# Patient Record
Sex: MALE | Race: BLACK OR AFRICAN AMERICAN | NOT HISPANIC OR LATINO
[De-identification: names, ages, dates, MRNs, and addresses within clinical notes are randomized per-mention and may not be internally consistent; named-entity substitution may affect disease eponyms.]

---

## 2017-10-09 PROBLEM — Z00.00 ENCOUNTER FOR PREVENTIVE HEALTH EXAMINATION: Status: ACTIVE | Noted: 2017-10-09

## 2017-10-30 ENCOUNTER — APPOINTMENT (OUTPATIENT)
Dept: NEPHROLOGY | Facility: CLINIC | Age: 67
End: 2017-10-30
Payer: MEDICAID

## 2017-10-30 ENCOUNTER — LABORATORY RESULT (OUTPATIENT)
Age: 67
End: 2017-10-30

## 2017-10-30 VITALS
DIASTOLIC BLOOD PRESSURE: 70 MMHG | SYSTOLIC BLOOD PRESSURE: 100 MMHG | HEIGHT: 67 IN | HEART RATE: 71 BPM | WEIGHT: 140 LBS | OXYGEN SATURATION: 99 % | BODY MASS INDEX: 21.97 KG/M2

## 2017-10-30 DIAGNOSIS — Z87.891 PERSONAL HISTORY OF NICOTINE DEPENDENCE: ICD-10-CM

## 2017-10-30 PROCEDURE — 36415 COLL VENOUS BLD VENIPUNCTURE: CPT

## 2017-10-30 PROCEDURE — 99205 OFFICE O/P NEW HI 60 MIN: CPT | Mod: 25

## 2017-10-30 RX ORDER — ASPIRIN 81 MG/1
81 TABLET, CHEWABLE ORAL DAILY
Qty: 90 | Refills: 3 | Status: ACTIVE | COMMUNITY
Start: 2017-10-30

## 2017-10-30 RX ORDER — FAMOTIDINE 40 MG/1
40 TABLET, FILM COATED ORAL DAILY
Refills: 0 | Status: ACTIVE | COMMUNITY
Start: 2017-10-30

## 2017-10-30 RX ORDER — DEXAMETHASONE 4 MG/1
4 TABLET ORAL TWICE DAILY
Refills: 0 | Status: ACTIVE | COMMUNITY
Start: 2017-10-30

## 2017-10-30 RX ORDER — FERROUS SULFATE TAB EC 325 MG (65 MG FE EQUIVALENT) 325 (65 FE) MG
325 (65 FE) TABLET DELAYED RESPONSE ORAL DAILY
Refills: 0 | Status: ACTIVE | COMMUNITY
Start: 2017-10-30

## 2017-10-30 RX ORDER — DOCUSATE SODIUM 100 MG/1
100 CAPSULE ORAL
Refills: 0 | Status: ACTIVE | COMMUNITY
Start: 2017-10-30

## 2017-10-30 RX ORDER — WARFARIN SODIUM 2 MG/1
2 TABLET ORAL DAILY
Refills: 0 | Status: ACTIVE | COMMUNITY
Start: 2017-10-30

## 2017-10-30 RX ORDER — TAMSULOSIN HYDROCHLORIDE 0.4 MG/1
0.4 CAPSULE ORAL
Qty: 90 | Refills: 1 | Status: ACTIVE | COMMUNITY
Start: 2017-10-30

## 2017-10-31 LAB
25(OH)D3 SERPL-MCNC: 20.9 NG/ML
ALBUMIN SERPL ELPH-MCNC: 3.5 G/DL
ANION GAP SERPL CALC-SCNC: 20 MMOL/L
APPEARANCE: CLEAR
BACTERIA: NEGATIVE
BASOPHILS # BLD AUTO: 0 K/UL
BASOPHILS NFR BLD AUTO: 0 %
BILIRUBIN URINE: NEGATIVE
BLOOD URINE: ABNORMAL
BUN SERPL-MCNC: 18 MG/DL
CALCIUM SERPL-MCNC: 8.8 MG/DL
CALCIUM SERPL-MCNC: 8.8 MG/DL
CHLORIDE SERPL-SCNC: 98 MMOL/L
CO2 SERPL-SCNC: 20 MMOL/L
COLOR: YELLOW
CREAT SERPL-MCNC: 0.84 MG/DL
CREAT SPEC-SCNC: 136 MG/DL
CREAT/PROT UR: 0.2 RATIO
EOSINOPHIL # BLD AUTO: 0 K/UL
EOSINOPHIL NFR BLD AUTO: 0 %
GLUCOSE QUALITATIVE U: NEGATIVE MG/DL
GLUCOSE SERPL-MCNC: 155 MG/DL
HCT VFR BLD CALC: 35.6 %
HGB BLD-MCNC: 11.5 G/DL
HYALINE CASTS: 2 /LPF
KETONES URINE: ABNORMAL
LEUKOCYTE ESTERASE URINE: NEGATIVE
LYMPHOCYTES # BLD AUTO: 0.86 K/UL
LYMPHOCYTES NFR BLD AUTO: 10.4 %
MAN DIFF?: NORMAL
MCHC RBC-ENTMCNC: 31.3 PG
MCHC RBC-ENTMCNC: 32.3 GM/DL
MCV RBC AUTO: 96.7 FL
MICROSCOPIC-UA: NORMAL
MONOCYTES # BLD AUTO: 0.47 K/UL
MONOCYTES NFR BLD AUTO: 5.7 %
NEUTROPHILS # BLD AUTO: 6.76 K/UL
NEUTROPHILS NFR BLD AUTO: 82.1 %
NITRITE URINE: NEGATIVE
PARATHYROID HORMONE INTACT: 35 PG/ML
PH URINE: 5
PHOSPHATE SERPL-MCNC: 4 MG/DL
PLATELET # BLD AUTO: 320 K/UL
POTASSIUM SERPL-SCNC: 4.2 MMOL/L
PROT UR-MCNC: 29 MG/DL
PROTEIN URINE: ABNORMAL MG/DL
RBC # BLD: 3.68 M/UL
RBC # FLD: 17.2 %
RED BLOOD CELLS URINE: 3 /HPF
SODIUM SERPL-SCNC: 138 MMOL/L
SPECIFIC GRAVITY URINE: 1.03
SQUAMOUS EPITHELIAL CELLS: 2 /HPF
UROBILINOGEN URINE: NEGATIVE MG/DL
WBC # FLD AUTO: 8.23 K/UL
WHITE BLOOD CELLS URINE: 3 /HPF

## 2017-10-31 RX ORDER — CHOLECALCIFEROL (VITAMIN D3) 1250 MCG
1.25 MG CAPSULE ORAL
Qty: 13 | Refills: 3 | Status: ACTIVE | COMMUNITY
Start: 2017-10-31

## 2017-11-30 ENCOUNTER — APPOINTMENT (OUTPATIENT)
Dept: NEPHROLOGY | Facility: CLINIC | Age: 67
End: 2017-11-30

## 2017-12-18 ENCOUNTER — APPOINTMENT (OUTPATIENT)
Dept: NEPHROLOGY | Facility: CLINIC | Age: 67
End: 2017-12-18
Payer: MEDICAID

## 2017-12-18 VITALS
BODY MASS INDEX: 21.97 KG/M2 | SYSTOLIC BLOOD PRESSURE: 100 MMHG | WEIGHT: 140 LBS | HEIGHT: 67 IN | DIASTOLIC BLOOD PRESSURE: 60 MMHG

## 2017-12-18 DIAGNOSIS — E55.9 VITAMIN D DEFICIENCY, UNSPECIFIED: ICD-10-CM

## 2017-12-18 DIAGNOSIS — N18.3 CHRONIC KIDNEY DISEASE, STAGE 3 (MODERATE): ICD-10-CM

## 2017-12-18 PROCEDURE — 99215 OFFICE O/P EST HI 40 MIN: CPT | Mod: 25

## 2017-12-18 PROCEDURE — 36415 COLL VENOUS BLD VENIPUNCTURE: CPT

## 2017-12-19 LAB
ALBUMIN SERPL ELPH-MCNC: 3 G/DL
ANION GAP SERPL CALC-SCNC: 22 MMOL/L
APPEARANCE: CLEAR
BACTERIA: NEGATIVE
BASOPHILS # BLD AUTO: 0.01 K/UL
BASOPHILS NFR BLD AUTO: 0.3 %
BILIRUBIN URINE: NEGATIVE
BLOOD URINE: ABNORMAL
BUN SERPL-MCNC: 11 MG/DL
CALCIUM SERPL-MCNC: 8.3 MG/DL
CHLORIDE SERPL-SCNC: 92 MMOL/L
CO2 SERPL-SCNC: 22 MMOL/L
COLOR: YELLOW
CREAT SERPL-MCNC: 0.48 MG/DL
CREAT SPEC-SCNC: 73 MG/DL
CREAT/PROT UR: 0.7 RATIO
EOSINOPHIL # BLD AUTO: 0.06 K/UL
EOSINOPHIL NFR BLD AUTO: 1.6 %
GLUCOSE QUALITATIVE U: 1000 MG/DL
GLUCOSE SERPL-MCNC: 273 MG/DL
HCT VFR BLD CALC: 29.8 %
HGB BLD-MCNC: 9.3 G/DL
HYALINE CASTS: 2 /LPF
IMM GRANULOCYTES NFR BLD AUTO: 0.8 %
KETONES URINE: ABNORMAL
LEUKOCYTE ESTERASE URINE: NEGATIVE
LYMPHOCYTES # BLD AUTO: 0.66 K/UL
LYMPHOCYTES NFR BLD AUTO: 18.1 %
MAN DIFF?: NORMAL
MCHC RBC-ENTMCNC: 31.2 GM/DL
MCHC RBC-ENTMCNC: 31.5 PG
MCV RBC AUTO: 101 FL
MICROSCOPIC-UA: NORMAL
MONOCYTES # BLD AUTO: 0.49 K/UL
MONOCYTES NFR BLD AUTO: 13.4 %
NEUTROPHILS # BLD AUTO: 2.4 K/UL
NEUTROPHILS NFR BLD AUTO: 65.8 %
NITRITE URINE: NEGATIVE
PH URINE: 5
PHOSPHATE SERPL-MCNC: 3.2 MG/DL
PLATELET # BLD AUTO: 248 K/UL
POTASSIUM SERPL-SCNC: 4.5 MMOL/L
PROT UR-MCNC: 54 MG/DL
PROTEIN URINE: 30 MG/DL
RBC # BLD: 2.95 M/UL
RBC # FLD: 17.1 %
RED BLOOD CELLS URINE: 2 /HPF
SODIUM SERPL-SCNC: 136 MMOL/L
SPECIFIC GRAVITY URINE: 1.04
SQUAMOUS EPITHELIAL CELLS: 2 /HPF
UROBILINOGEN URINE: NEGATIVE MG/DL
WBC # FLD AUTO: 3.65 K/UL
WHITE BLOOD CELLS URINE: 5 /HPF

## 2018-01-01 ENCOUNTER — OUTPATIENT (OUTPATIENT)
Dept: OUTPATIENT SERVICES | Facility: HOSPITAL | Age: 68
LOS: 1 days | End: 2018-01-01
Payer: MEDICAID

## 2018-01-23 ENCOUNTER — EMERGENCY (EMERGENCY)
Facility: HOSPITAL | Age: 68
LOS: 1 days | Discharge: HOME | End: 2018-01-23
Attending: EMERGENCY MEDICINE
Payer: COMMERCIAL

## 2018-01-23 VITALS
SYSTOLIC BLOOD PRESSURE: 110 MMHG | DIASTOLIC BLOOD PRESSURE: 68 MMHG | TEMPERATURE: 98 F | HEART RATE: 98 BPM | OXYGEN SATURATION: 98 % | RESPIRATION RATE: 18 BRPM

## 2018-01-23 VITALS
SYSTOLIC BLOOD PRESSURE: 102 MMHG | HEART RATE: 115 BPM | RESPIRATION RATE: 20 BRPM | DIASTOLIC BLOOD PRESSURE: 63 MMHG | HEIGHT: 67 IN | OXYGEN SATURATION: 97 % | WEIGHT: 160.06 LBS | TEMPERATURE: 98 F

## 2018-01-23 LAB
ALBUMIN SERPL ELPH-MCNC: 2.9 G/DL — LOW (ref 3.3–5.2)
ALP SERPL-CCNC: 229 U/L — HIGH (ref 40–120)
ALT FLD-CCNC: 39 U/L — SIGNIFICANT CHANGE UP
ANION GAP SERPL CALC-SCNC: 14 MMOL/L — SIGNIFICANT CHANGE UP (ref 5–17)
APTT BLD: 32 SEC — SIGNIFICANT CHANGE UP (ref 27.5–37.4)
AST SERPL-CCNC: 55 U/L — HIGH
BILIRUB SERPL-MCNC: 0.6 MG/DL — SIGNIFICANT CHANGE UP (ref 0.4–2)
BUN SERPL-MCNC: 7 MG/DL — LOW (ref 8–20)
CALCIUM SERPL-MCNC: 7.9 MG/DL — LOW (ref 8.6–10.2)
CHLORIDE SERPL-SCNC: 90 MMOL/L — LOW (ref 98–107)
CO2 SERPL-SCNC: 25 MMOL/L — SIGNIFICANT CHANGE UP (ref 22–29)
CREAT SERPL-MCNC: 0.38 MG/DL — LOW (ref 0.5–1.3)
GLUCOSE SERPL-MCNC: 78 MG/DL — SIGNIFICANT CHANGE UP (ref 70–115)
HCT VFR BLD CALC: 27.7 % — LOW (ref 42–52)
HGB BLD-MCNC: 9.1 G/DL — LOW (ref 14–18)
INR BLD: 1.91 RATIO — HIGH (ref 0.88–1.16)
MCHC RBC-ENTMCNC: 32.9 G/DL — SIGNIFICANT CHANGE UP (ref 32–36)
MCHC RBC-ENTMCNC: 32.9 PG — HIGH (ref 27–31)
MCV RBC AUTO: 100 FL — HIGH (ref 80–94)
PLATELET # BLD AUTO: 197 K/UL — SIGNIFICANT CHANGE UP (ref 150–400)
POTASSIUM SERPL-MCNC: 4.7 MMOL/L — SIGNIFICANT CHANGE UP (ref 3.5–5.3)
POTASSIUM SERPL-SCNC: 4.7 MMOL/L — SIGNIFICANT CHANGE UP (ref 3.5–5.3)
PROT SERPL-MCNC: 6.2 G/DL — LOW (ref 6.6–8.7)
PROTHROM AB SERPL-ACNC: 21.3 SEC — HIGH (ref 9.8–12.7)
RBC # BLD: 2.77 M/UL — LOW (ref 4.6–6.2)
RBC # FLD: 16.7 % — HIGH (ref 11–15.6)
SODIUM SERPL-SCNC: 129 MMOL/L — LOW (ref 135–145)
WBC # BLD: 3.7 K/UL — LOW (ref 4.8–10.8)
WBC # FLD AUTO: 3.7 K/UL — LOW (ref 4.8–10.8)

## 2018-01-23 PROCEDURE — 99283 EMERGENCY DEPT VISIT LOW MDM: CPT

## 2018-01-23 PROCEDURE — 82962 GLUCOSE BLOOD TEST: CPT

## 2018-01-23 PROCEDURE — 99284 EMERGENCY DEPT VISIT MOD MDM: CPT

## 2018-01-23 PROCEDURE — 80053 COMPREHEN METABOLIC PANEL: CPT

## 2018-01-23 PROCEDURE — 85027 COMPLETE CBC AUTOMATED: CPT

## 2018-01-23 PROCEDURE — 36415 COLL VENOUS BLD VENIPUNCTURE: CPT

## 2018-01-23 PROCEDURE — 85610 PROTHROMBIN TIME: CPT

## 2018-01-23 PROCEDURE — 85730 THROMBOPLASTIN TIME PARTIAL: CPT

## 2018-01-23 NOTE — ED ADULT TRIAGE NOTE - CHIEF COMPLAINT QUOTE
Patient brought in by ambulance A/Ox3, was found unresponsive, BS-12, given 1 amp of D50-BS at this time 97.

## 2018-01-23 NOTE — ED PROVIDER NOTE - OBJECTIVE STATEMENT
This is a 66 y/o M PMHx DVT, PE prostate CA and IDDM presents to ED c/o medical evaluation. Patients wife reports they were at home, pt asked for food, she went to get food and when she came back pt was unarousable and shaking. Pt was at [facility] ED yesterday morning for same Sx; did not eat, took his insulin and became unresponsive and began to shake. Today pt did not take his insulin due to fearing having same episode occur. Pt had and egg sandwich, crackers and coffee today. As per daughter for the past year pt has had to be forced to eat due to decreased apatite. On Pepcid, Coumadin and Novolog. Denies N/V/D, fever, chills, SOB, CP, difficulty breathing, HA, numbness, tingling and abd pain. This is a 68 y/o M PMHx DVT, PE prostate CA and IDDM presents to ED c/o medical evaluation. Patients wife reports they were at home, pt asked for food, she went to get food and when she came back pt was unarousable and shaking. Pt was at [facility] ED yesterday morning for same Sx; did not eat, took his insulin and became unresponsive and began to shake. Today pt did not take his insulin due to fearing having same episode occur. Pt had and egg sandwich, crackers and coffee today. As per daughter for the past year pt has had to be forced to eat due to decreased apatite. On Pepcid, Coumadin and Novolog. Denies N/V/D, fever, chills, SOB, CP, difficulty breathing, HA, numbness, tingling and abd pain.    Patient reports that he DID take his lantus last night, but no short acting insulin today, did not eat much today.

## 2018-01-23 NOTE — ED PROVIDER NOTE - PMH
DVT (deep venous thrombosis)    IDDM (insulin dependent diabetes mellitus)    Prostate CA    Pulmonary embolism

## 2018-01-23 NOTE — ED PROVIDER NOTE - PROGRESS NOTE DETAILS
reassessed, looks and feels well, FS at good level; advised on medication adjustments and to f/u with PMD this week

## 2018-01-24 DIAGNOSIS — R69 ILLNESS, UNSPECIFIED: ICD-10-CM

## 2018-05-28 ENCOUNTER — INPATIENT (INPATIENT)
Facility: HOSPITAL | Age: 68
LOS: 12 days | DRG: 811 | End: 2018-06-10
Attending: HOSPITALIST | Admitting: HOSPITALIST
Payer: COMMERCIAL

## 2018-05-28 VITALS
HEART RATE: 111 BPM | TEMPERATURE: 98 F | SYSTOLIC BLOOD PRESSURE: 103 MMHG | RESPIRATION RATE: 40 BRPM | OXYGEN SATURATION: 100 % | DIASTOLIC BLOOD PRESSURE: 70 MMHG

## 2018-05-28 DIAGNOSIS — C61 MALIGNANT NEOPLASM OF PROSTATE: ICD-10-CM

## 2018-05-28 DIAGNOSIS — K92.0 HEMATEMESIS: ICD-10-CM

## 2018-05-28 DIAGNOSIS — D64.9 ANEMIA, UNSPECIFIED: ICD-10-CM

## 2018-05-28 LAB
ABO RH CONFIRMATION: SIGNIFICANT CHANGE UP
ACETONE SERPL-MCNC: NEGATIVE — SIGNIFICANT CHANGE UP
ALBUMIN SERPL ELPH-MCNC: 2.6 G/DL — LOW (ref 3.3–5.2)
ALP SERPL-CCNC: 176 U/L — HIGH (ref 40–120)
ALT FLD-CCNC: 11 U/L — SIGNIFICANT CHANGE UP
ANION GAP SERPL CALC-SCNC: 17 MMOL/L — SIGNIFICANT CHANGE UP (ref 5–17)
ANISOCYTOSIS BLD QL: SLIGHT — SIGNIFICANT CHANGE UP
APTT BLD: 29 SEC — SIGNIFICANT CHANGE UP (ref 27.5–37.4)
AST SERPL-CCNC: 22 U/L — SIGNIFICANT CHANGE UP
BASE EXCESS BLDV CALC-SCNC: -2.6 MMOL/L — LOW (ref -2–2)
BASOPHILS # BLD AUTO: 0 K/UL — SIGNIFICANT CHANGE UP (ref 0–0.2)
BILIRUB SERPL-MCNC: 0.6 MG/DL — SIGNIFICANT CHANGE UP (ref 0.4–2)
BLD GP AB SCN SERPL QL: SIGNIFICANT CHANGE UP
BUN SERPL-MCNC: 7 MG/DL — LOW (ref 8–20)
CA-I SERPL-SCNC: 1.04 MMOL/L — LOW (ref 1.15–1.33)
CALCIUM SERPL-MCNC: 7.8 MG/DL — LOW (ref 8.6–10.2)
CHLORIDE BLDV-SCNC: 95 MMOL/L — LOW (ref 98–107)
CHLORIDE SERPL-SCNC: 91 MMOL/L — LOW (ref 98–107)
CO2 SERPL-SCNC: 20 MMOL/L — LOW (ref 22–29)
CREAT SERPL-MCNC: 0.44 MG/DL — LOW (ref 0.5–1.3)
EOSINOPHIL # BLD AUTO: 0 K/UL — SIGNIFICANT CHANGE UP (ref 0–0.5)
EOSINOPHIL NFR BLD AUTO: 1 % — SIGNIFICANT CHANGE UP (ref 0–6)
GAS PNL BLDV: 128 MMOL/L — LOW (ref 135–145)
GAS PNL BLDV: SIGNIFICANT CHANGE UP
GAS PNL BLDV: SIGNIFICANT CHANGE UP
GLUCOSE BLDV-MCNC: 392 MG/DL — HIGH (ref 70–99)
GLUCOSE SERPL-MCNC: 399 MG/DL — HIGH (ref 70–115)
HCO3 BLDV-SCNC: 22 MMOL/L — SIGNIFICANT CHANGE UP (ref 20–26)
HCT VFR BLD CALC: 12.3 % — CRITICAL LOW (ref 42–52)
HCT VFR BLD CALC: 22.8 % — LOW (ref 42–52)
HCT VFR BLDA CALC: 12 — CRITICAL LOW (ref 39–50)
HGB BLD-MCNC: 3.9 G/DL — CRITICAL LOW (ref 14–18)
HGB BLD-MCNC: 7.3 G/DL — LOW (ref 14–18)
HYPOCHROMIA BLD QL: SIGNIFICANT CHANGE UP
INR BLD: 1.46 RATIO — HIGH (ref 0.88–1.16)
LACTATE BLDV-MCNC: 3.6 MMOL/L — HIGH (ref 0.5–2)
LYMPHOCYTES # BLD AUTO: 1.5 K/UL — SIGNIFICANT CHANGE UP (ref 1–4.8)
LYMPHOCYTES # BLD AUTO: 19 % — LOW (ref 20–55)
MACROCYTES BLD QL: SLIGHT — SIGNIFICANT CHANGE UP
MAGNESIUM SERPL-MCNC: 1.9 MG/DL — SIGNIFICANT CHANGE UP (ref 1.6–2.6)
MCHC RBC-ENTMCNC: 29.2 PG — SIGNIFICANT CHANGE UP (ref 27–31)
MCHC RBC-ENTMCNC: 31.4 G/DL — LOW (ref 32–36)
MCHC RBC-ENTMCNC: 31.7 PG — HIGH (ref 27–31)
MCHC RBC-ENTMCNC: 32 G/DL — SIGNIFICANT CHANGE UP (ref 32–36)
MCV RBC AUTO: 100.8 FL — HIGH (ref 80–94)
MCV RBC AUTO: 91.2 FL — SIGNIFICANT CHANGE UP (ref 80–94)
MONOCYTES # BLD AUTO: 0.9 K/UL — HIGH (ref 0–0.8)
MONOCYTES NFR BLD AUTO: 11 % — HIGH (ref 3–10)
NEUTROPHILS # BLD AUTO: 4.1 K/UL — SIGNIFICANT CHANGE UP (ref 1.8–8)
NEUTROPHILS NFR BLD AUTO: 68 % — SIGNIFICANT CHANGE UP (ref 37–73)
NEUTS BAND # BLD: 1 % — SIGNIFICANT CHANGE UP (ref 0–8)
OTHER CELLS CSF MANUAL: 5 ML/DL — LOW (ref 18–22)
OVALOCYTES BLD QL SMEAR: SLIGHT — SIGNIFICANT CHANGE UP
PCO2 BLDV: 37 MMHG — SIGNIFICANT CHANGE UP (ref 35–50)
PH BLDV: 7.38 — SIGNIFICANT CHANGE UP (ref 7.32–7.43)
PLAT MORPH BLD: SIGNIFICANT CHANGE UP
PLATELET # BLD AUTO: 113 K/UL — LOW (ref 150–400)
PLATELET # BLD AUTO: 115 K/UL — LOW (ref 150–400)
PO2 BLDV: 73 MMHG — HIGH (ref 25–45)
POIKILOCYTOSIS BLD QL AUTO: SLIGHT — SIGNIFICANT CHANGE UP
POLYCHROMASIA BLD QL SMEAR: SLIGHT — SIGNIFICANT CHANGE UP
POTASSIUM BLDV-SCNC: 4 MMOL/L — SIGNIFICANT CHANGE UP (ref 3.4–4.5)
POTASSIUM SERPL-MCNC: 4.1 MMOL/L — SIGNIFICANT CHANGE UP (ref 3.5–5.3)
POTASSIUM SERPL-SCNC: 4.1 MMOL/L — SIGNIFICANT CHANGE UP (ref 3.5–5.3)
PROT SERPL-MCNC: 7 G/DL — SIGNIFICANT CHANGE UP (ref 6.6–8.7)
PROTHROM AB SERPL-ACNC: 16.2 SEC — HIGH (ref 9.8–12.7)
RBC # BLD: 1.2 M/UL — LOW (ref 4.6–6.2)
RBC # BLD: 2.5 M/UL — LOW (ref 4.6–6.2)
RBC # FLD: 20.5 % — HIGH (ref 11–15.6)
RBC # FLD: 22.6 % — HIGH (ref 11–15.6)
RBC BLD AUTO: ABNORMAL
SAO2 % BLDV: 94 % — SIGNIFICANT CHANGE UP
SODIUM SERPL-SCNC: 128 MMOL/L — LOW (ref 135–145)
SPHEROCYTES BLD QL SMEAR: SLIGHT — SIGNIFICANT CHANGE UP
TYPE + AB SCN PNL BLD: SIGNIFICANT CHANGE UP
WBC # BLD: 4.9 K/UL — SIGNIFICANT CHANGE UP (ref 4.8–10.8)
WBC # BLD: 6.6 K/UL — SIGNIFICANT CHANGE UP (ref 4.8–10.8)
WBC # FLD AUTO: 4.9 K/UL — SIGNIFICANT CHANGE UP (ref 4.8–10.8)
WBC # FLD AUTO: 6.6 K/UL — SIGNIFICANT CHANGE UP (ref 4.8–10.8)

## 2018-05-28 PROCEDURE — 70450 CT HEAD/BRAIN W/O DYE: CPT | Mod: 26

## 2018-05-28 PROCEDURE — 99223 1ST HOSP IP/OBS HIGH 75: CPT

## 2018-05-28 PROCEDURE — 71045 X-RAY EXAM CHEST 1 VIEW: CPT | Mod: 26

## 2018-05-28 PROCEDURE — 99285 EMERGENCY DEPT VISIT HI MDM: CPT

## 2018-05-28 PROCEDURE — 93010 ELECTROCARDIOGRAM REPORT: CPT

## 2018-05-28 PROCEDURE — 74174 CTA ABD&PLVS W/CONTRAST: CPT | Mod: 26

## 2018-05-28 RX ORDER — PIPERACILLIN AND TAZOBACTAM 4; .5 G/20ML; G/20ML
3.38 INJECTION, POWDER, LYOPHILIZED, FOR SOLUTION INTRAVENOUS ONCE
Qty: 0 | Refills: 0 | Status: COMPLETED | OUTPATIENT
Start: 2018-05-28 | End: 2018-05-28

## 2018-05-28 RX ORDER — ONDANSETRON 8 MG/1
4 TABLET, FILM COATED ORAL ONCE
Qty: 0 | Refills: 0 | Status: COMPLETED | OUTPATIENT
Start: 2018-05-28 | End: 2018-05-28

## 2018-05-28 RX ORDER — SODIUM CHLORIDE 9 MG/ML
1000 INJECTION INTRAMUSCULAR; INTRAVENOUS; SUBCUTANEOUS ONCE
Qty: 0 | Refills: 0 | Status: COMPLETED | OUTPATIENT
Start: 2018-05-28 | End: 2018-05-28

## 2018-05-28 RX ORDER — INSULIN HUMAN 100 [IU]/ML
4 INJECTION, SOLUTION SUBCUTANEOUS ONCE
Qty: 0 | Refills: 0 | Status: COMPLETED | OUTPATIENT
Start: 2018-05-28 | End: 2018-05-28

## 2018-05-28 RX ORDER — ONDANSETRON 8 MG/1
4 TABLET, FILM COATED ORAL EVERY 6 HOURS
Qty: 0 | Refills: 0 | Status: DISCONTINUED | OUTPATIENT
Start: 2018-05-28 | End: 2018-05-31

## 2018-05-28 RX ORDER — PANTOPRAZOLE SODIUM 20 MG/1
80 TABLET, DELAYED RELEASE ORAL ONCE
Qty: 0 | Refills: 0 | Status: COMPLETED | OUTPATIENT
Start: 2018-05-28 | End: 2018-05-28

## 2018-05-28 RX ORDER — HYDROMORPHONE HYDROCHLORIDE 2 MG/ML
0.5 INJECTION INTRAMUSCULAR; INTRAVENOUS; SUBCUTANEOUS EVERY 6 HOURS
Qty: 0 | Refills: 0 | Status: DISCONTINUED | OUTPATIENT
Start: 2018-05-28 | End: 2018-05-31

## 2018-05-28 RX ORDER — PHYTONADIONE (VIT K1) 5 MG
5 TABLET ORAL ONCE
Qty: 0 | Refills: 0 | Status: COMPLETED | OUTPATIENT
Start: 2018-05-28 | End: 2018-05-28

## 2018-05-28 RX ORDER — HYDROMORPHONE HYDROCHLORIDE 2 MG/ML
0.5 INJECTION INTRAMUSCULAR; INTRAVENOUS; SUBCUTANEOUS ONCE
Qty: 0 | Refills: 0 | Status: DISCONTINUED | OUTPATIENT
Start: 2018-05-28 | End: 2018-05-28

## 2018-05-28 RX ORDER — LIDOCAINE HCL 20 MG/ML
5 VIAL (ML) INJECTION ONCE
Qty: 0 | Refills: 0 | Status: COMPLETED | OUTPATIENT
Start: 2018-05-28 | End: 2018-05-28

## 2018-05-28 RX ORDER — SODIUM CHLORIDE 9 MG/ML
1000 INJECTION INTRAMUSCULAR; INTRAVENOUS; SUBCUTANEOUS
Qty: 0 | Refills: 0 | Status: DISCONTINUED | OUTPATIENT
Start: 2018-05-28 | End: 2018-05-31

## 2018-05-28 RX ADMIN — Medication 101 MILLIGRAM(S): at 19:43

## 2018-05-28 RX ADMIN — SODIUM CHLORIDE 2000 MILLILITER(S): 9 INJECTION INTRAMUSCULAR; INTRAVENOUS; SUBCUTANEOUS at 14:37

## 2018-05-28 RX ADMIN — PANTOPRAZOLE SODIUM 80 MILLIGRAM(S): 20 TABLET, DELAYED RELEASE ORAL at 14:48

## 2018-05-28 RX ADMIN — INSULIN HUMAN 4 UNIT(S): 100 INJECTION, SOLUTION SUBCUTANEOUS at 18:30

## 2018-05-28 RX ADMIN — HYDROMORPHONE HYDROCHLORIDE 0.5 MILLIGRAM(S): 2 INJECTION INTRAMUSCULAR; INTRAVENOUS; SUBCUTANEOUS at 18:45

## 2018-05-28 RX ADMIN — ONDANSETRON 4 MILLIGRAM(S): 8 TABLET, FILM COATED ORAL at 14:37

## 2018-05-28 RX ADMIN — HYDROMORPHONE HYDROCHLORIDE 0.5 MILLIGRAM(S): 2 INJECTION INTRAMUSCULAR; INTRAVENOUS; SUBCUTANEOUS at 20:58

## 2018-05-28 RX ADMIN — PIPERACILLIN AND TAZOBACTAM 200 GRAM(S): 4; .5 INJECTION, POWDER, LYOPHILIZED, FOR SOLUTION INTRAVENOUS at 14:53

## 2018-05-28 RX ADMIN — SODIUM CHLORIDE 75 MILLILITER(S): 9 INJECTION INTRAMUSCULAR; INTRAVENOUS; SUBCUTANEOUS at 19:10

## 2018-05-28 RX ADMIN — Medication 5 MILLILITER(S): at 18:50

## 2018-05-28 RX ADMIN — ONDANSETRON 4 MILLIGRAM(S): 8 TABLET, FILM COATED ORAL at 15:10

## 2018-05-28 NOTE — H&P ADULT - PROBLEM SELECTOR PLAN 1
likely secondary to worsening and progression of chronic disease- Metastatic prostate cancer  CT head ordered- shows findings concerning for mets vs subdural hematoma secondary to trauma, pt family denies any recent trauma and no signs of such found on exam. In this patient, although with anemia, findings more consistent with mets as pt already with stage 4 CA and progressive decline.   Pt now on comfort/pallative measures, to meet with hospice to evaluate for hospice inn  family agrees to vitals, lab tests, imaging, management of pain and comfort care   pt made DNR/DNI by ICU, consult appreciated  cont IV dilaudid prn

## 2018-05-28 NOTE — ED PROVIDER NOTE - PROGRESS NOTE DETAILS
Heme came back 3.5. Pt appears to be in hemorraghic shock. Will obtain transfusion and reassess. Heme came back 3.5. Pt appears to be in hemorraghic shock. Will obtain transfusion and reassess. Pt unable to consent for blood and no family present, will give emergency consent by physician, contact family at earliest possible opportunity. Sister priscila said "do everything" on last conversation Pt cannot consent for IV contrast. Administrative I am providing administrative consent Vital signs better, breathing appears improved, color returning but still tachypneic and ill appearing. Pt cannot consent for IV contrast. I am providing administrative consent per d/w ICU patient to take primarily palliative route, will not go to ICU. CT head ordered for AMS but will not change disposition given goals of care. Hemodynamically stbale. per d/w hospitalist will admit ot stepdown Heme came back 3.5. Pt appears to be in hemorraghic shock, not sepsis or dka. Will obtain transfusion and reassess. Pt unable to consent for blood and no family present, will give emergency consent by physician, contact family at earliest possible opportunity. Sister priscila said "do everything" on last conversation Vital signs better, breathing appears improved, color returning but still tachypneic (though improved) and ill appearing. per d/w ICU patient to take primarily palliative route, will not go to ICU. CT head ordered for AMS but will not change disposition given goals of care. Hemodynamically stable. per d/w hospitalist will admit ot stepdown

## 2018-05-28 NOTE — H&P ADULT - HISTORY OF PRESENT ILLNESS
Pt is a 68 yo Male with a pmh/o afib previously on coumadin and advanced stage 4 metastatic Prostate CA who was referred to hospice but opted for home care, who is no longer on treatment and had been referred to Hospice a few months ago. He has been in a severe state of decline over the last month with poor eating and drinking. Today he presents with severe weakness and debillitation. He is found to have a Hgb of 3, with a report of black stool by the wife over the last 2 weeks. He received 2 PRBC in the ER. Pt is a 68 yo Male with a pmh/o afib previously on coumadin and advanced stage 4 metastatic Prostate CA who was referred to hospice but opted for home care, who is no longer on treatment who presents to ED with family for rapid decline in PO intake, mentation, and quality of life. Family at bedside to provide history. Family states he is recently, constantly in pain, moaning and less and less alert. Pt was evaluated by ICU and exstensive conversation had where DNR/DNI and comfort goals established. Pt was transfused with appropriate response in ED after HgB 3. Pt had questionable episode of hematemesis at home after strawberry shake, no nausea or vomiting noted in ED since presentation, no diarrhea, no cp, no palpitations.

## 2018-05-28 NOTE — ED ADULT NURSE NOTE - OBJECTIVE STATEMENT
Pt biba, s/p blood in vomit.  Pt appears very pale, tachypneic, tachycardic and hypotensive.  Pt is alert and oriented to person and place only.  Home health nurse at bedside.  NO blood stools as per pt and daughter.  CM in place, Dr. Smith at bedside.  Pt is being treated for prostate cancer with oral chemo at this time.

## 2018-05-28 NOTE — ED PROVIDER NOTE - UNABLE TO OBTAIN
Call back to patient. Patient requesting medication to be sent to a different pharmacy. [facility], phone number [phone]. Script sent.   AMS Severe Illness/Injury

## 2018-05-28 NOTE — CONSULT NOTE ADULT - SUBJECTIVE AND OBJECTIVE BOX
REASON FOR CONSULT: ***    CONSULT REQUESTED BY: ***    Patient is a 67y old  Male who presents with a chief complaint of     BRIEF HOSPITAL COURSE: 67M with advanced stage 4 metastatic Prostate CA, who is no longer on treatment and had been referred to Hospice a few months ago. He has been in a severe state of decline over the last month with poor eating and drinking. Today he presents with severe weakness and debillitation. He is found to have a Hgb of 3, with a report of black stool by the wife over the last 2 weeks. He received 2 PRBC in the ER. His BP is 100/50 and HR is 100.            PAST MEDICAL & SURGICAL HISTORY:  Prostate CA  Pulmonary embolism  DVT (deep venous thrombosis)  IDDM (insulin dependent diabetes mellitus)    Allergies    No Known Allergies    Intolerances      FAMILY HISTORY:      Review of Systems:  CONSTITUTIONAL: No fever, chills, or fatigue  EYES: No eye pain, visual disturbances, or discharge  ENMT:  No difficulty hearing, tinnitus, vertigo; No sinus or throat pain  NECK: No pain or stiffness  RESPIRATORY: No cough, wheezing, chills or hemoptysis; No shortness of breath  CARDIOVASCULAR: No chest pain, palpitations, dizziness, or leg swelling  GASTROINTESTINAL: + Dark stools  GENITOURINARY: No dysuria, frequency, hematuria, or incontinence  NEUROLOGICAL: No headaches, +memory loss, +loss of strength, no numbness, or tremors  SKIN: No itching, burning, rashes, or lesions   MUSCULOSKELETAL: + weakness generally   PSYCHIATRIC: + depression, anxiety, mood swings, or difficulty sleeping      Medications:      HYDROmorphone  Injectable 0.5 milliGRAM(s) IV Push every 6 hours PRN  phytonadione  IVPB 5 milliGRAM(s) IV Intermittent once  sodium chloride 0.9%. 1000 milliLiter(s) IV Continuous <Continuous>      ICU Vital Signs Last 24 Hrs  T(C): 36.4 (28 May 2018 15:50), Max: 36.7 (28 May 2018 13:50)  T(F): 97.5 (28 May 2018 15:50), Max: 98.1 (28 May 2018 13:50)  HR: 102 (28 May 2018 17:39) (102 - 111)  BP: 98/63 (28 May 2018 17:39) (91/54 - 114/56)  BP(mean): --  ABP: --  ABP(mean): --  RR: 30 (28 May 2018 17:39) (30 - 40)  SpO2: 100% (28 May 2018 17:39) (100% - 100%)    Vital Signs Last 24 Hrs  T(C): 36.4 (28 May 2018 15:50), Max: 36.7 (28 May 2018 13:50)  T(F): 97.5 (28 May 2018 15:50), Max: 98.1 (28 May 2018 13:50)  HR: 102 (28 May 2018 17:39) (102 - 111)  BP: 98/63 (28 May 2018 17:39) (91/54 - 114/56)  BP(mean): --  RR: 30 (28 May 2018 17:39) (30 - 40)  SpO2: 100% (28 May 2018 17:39) (100% - 100%)        I&O's Detail        LABS:                        7.3    4.9   )-----------( 115      ( 28 May 2018 18:11 )             22.8     05-28    128<L>  |  91<L>  |  7.0<L>  ----------------------------<  399<H>  4.1   |  20.0<L>  |  0.44<L>    Ca    7.8<L>      28 May 2018 14:35  Mg     1.9     05-28    TPro  7.0  /  Alb  2.6<L>  /  TBili  0.6  /  DBili  x   /  AST  22  /  ALT  11  /  AlkPhos  176<H>  05-28          CAPILLARY BLOOD GLUCOSE      POCT Blood Glucose.: 376 mg/dL (28 May 2018 16:03)    PT/INR - ( 28 May 2018 14:35 )   PT: 16.2 sec;   INR: 1.46 ratio         PTT - ( 28 May 2018 14:35 )  PTT:29.0 sec    CULTURES:      Physical Examination:    General: Cacexia with bitemporal wasting, lethargic weakly responsive, pale and tachypneic but not in active distress    HEENT: Pupils equal, reactive to light.  Symmetric.    PULM: Clear to auscultation bilaterally, no significant sputum production    CVS: Regular rate and rhythm, no murmurs, rubs, or gallops    ABD: Soft, nondistended, nontender, normoactive bowel sounds, no masses, + dark tarry stool     EXT: No edema, nontender    SKIN: Warm and well perfused, no rashes noted.    RADIOLOGY: LOWER CHEST: Small bilateral pleural effusions. Mural thickening of the   distal esophagus. Partially imaged central venous catheter with tip   terminating in the superior cavoatrial junction.    LIVER: Within normal limits.  BILE DUCTS: Normal caliber.   GALLBLADDER: Within normal limits.  SPLEEN: Within normal limits.  PANCREAS: Within normal limits.  ADRENALS: Within normal limits.  KIDNEYS/URETERS: Within normal limits.     BLADDER: Within normal limits.  REPRODUCTIVE ORGANS: The prostate gland is not enlarged.    BOWEL: No bowel obstruction. Normal appendix. No evidence of intraluminal   contrast extravasation to suggest source for active GI bleed.   PERITONEUM: No ascites.  VESSELS:  Atherosclerotic changeof the abdominal aorta and its branches.  LYMPH NODES: Enlarged right inguinal node (series 4, image 119),   measuring 2.8 x 1.8 cm.    ABDOMINAL WALL: Within normal limits.  BONES: Diffuse sclerotic osseous metastases. Degenerative changes of the   spine.    IMPRESSION:   No intraluminal contrast extravasation to suggest source for active GI   bleed.    Diffuse sclerotic osseous metastases.      CRITICAL CARE TIME SPENT:  40 mins were spent evaluating and treating this critical patient

## 2018-05-28 NOTE — ED ADULT NURSE NOTE - CHIEF COMPLAINT QUOTE
Pt BIBA from home c/o vomiting blood since last night, pt is on coumadin. Pt tachypneic RR 39 upon ED arrival. Pt currently being treated for pancreatic CA. RA sat 85%. MD Smith at bedside.

## 2018-05-28 NOTE — CHART NOTE - NSCHARTNOTEFT_GEN_A_CORE
Wife deferred goals of care discussion to her son.  Spoke with son Jw - discussed patient had been referred to hospice back in March by their oncologist Dr. Gray. The family at the time felt he did not need hospice at that time due to his functional status, he did have a visiting nurse coming to the house. He has been gradually declining per son and now has difficultly eating and weakness. he presented with severe anemia and dark stools, received blood, hemodynamic stable. Discussed goals of care - DNR/DNI, son would like to speak with hospice tomorrow with his mother. Discussed father is likely to die in days to months, will start IV dilaudid for pain (unable to take PO at this point.), he would likely benefit from inpatient hospice.

## 2018-05-28 NOTE — H&P ADULT - PROBLEM SELECTOR PLAN 3
no further episodes of emesis  h/h responded appropriately s/p transfusion  no active signs of bleeding  appreciate GI consult-no intervention likely given pt now made comfort care and awaiting hospice  NPO for aspiration precaution as pt not alert

## 2018-05-28 NOTE — ED ADULT TRIAGE NOTE - CHIEF COMPLAINT QUOTE
Pt BIBA from home c/o vomiting blood since last night, pt is on coumadin. Pt tachypneic RR 39 upon ED arrival. Pt BIBA from home c/o vomiting blood since last night, pt is on coumadin. Pt tachypneic RR 39 upon ED arrival. Pt currently being treated for pancreatic CA. RA sat 85%. MD Smith at bedside.

## 2018-05-28 NOTE — CONSULT NOTE ADULT - SUBJECTIVE AND OBJECTIVE BOX
Patient is a 67y old  Male who presents with a chief complaint of hematemesis and AMS and severe anemia    HPI: Hx obtained from the chart and from ER MD as pt. is confused and tachypneic. He was brought in fo AMS, hyperglycemia and vomiting blood at home according to the pt's daughter. There is no prior h/o GI bleeding and pt has advanced [prostate CA and h/o DVT and PE and is on Coumadin. Pt. has had no episodes of hematemesis in ED and is presently hypotensive and tachycardic and received two units of  PRBC's via rapid infusion and is less tachypneic presently. Hb 3.9 on admission here. His baseline Hb is unknown.      REVIEW OF SYSTEMS: Limited by his AMS.  GI ROS: As noted above. Pt apparently has no c/o abdominal pain and had no melena or hematochezia Remainder of ROS unobtainable.    PAST MEDICAL & SURGICAL HISTORY:  Prostate CA  Pulmonary embolism  DVT (deep venous thrombosis)  IDDM (insulin dependent diabetes mellitus)      FAMILY HISTORY:      SOCIAL HISTORY:  Smoking Status: [ ] Current, [ ] Former, [ ] Never unknown  Pack Years: Unknown. Unknown ETOH or drug abuse history.    MEDICATIONS: At home Coumadin and Insulin. Other medications unknown.  MEDICATIONS  (STANDING):  HYDROmorphone  Injectable 0.5 milliGRAM(s) IV Push Once  insulin regular  human recombinant. 4 Unit(s) SubCutaneous once  sodium chloride 0.9%. 1000 milliLiter(s) (75 mL/Hr) IV Continuous <Continuous>    MEDICATIONS  (PRN):      Allergies    No Known Allergies    Intolerances        Vital Signs Last 24 Hrs  T(C): 36.4 (28 May 2018 15:50), Max: 36.7 (28 May 2018 13:50)  T(F): 97.5 (28 May 2018 15:50), Max: 98.1 (28 May 2018 13:50)  HR: 109 (28 May 2018 15:50) (108 - 111)  BP: 114/56 (28 May 2018 15:50) (91/54 - 114/56)  BP(mean): --  RR: 34 (28 May 2018 15:50) (30 - 40)  SpO2: 100% (28 May 2018 15:50) (100% - 100%)        PHYSICAL EXAM:    General: Well developed; ill appearing male alert, lethargic. tachypneic; in moderate distress when seen.  HEENT: MMM, conjunctiva pale and sclera anicteric.  Lungs: Bilateral rhonchi.  Cor: Regular rhythm with increased rate of roughly 120 bpm.  Gastrointestinal: Soft, non-tender non-distended; Normal bowel sounds; No rebound or guarding or palpable HSM.  SUSANNE: Decreased sphincter tone, Dark green stool in rectal vault w/o melena or BRB.  Extremities: Normal range of motion, No clubbing, cyanosis or edema  Neurological: Alert , altered, lethargic        LABS:                        3.9    6.6   )-----------( 113      ( 28 May 2018 14:35 )             12.3     05-28    128<L>  |  91<L>  |  7.0<L>  ----------------------------<  399<H>  4.1   |  20.0<L>  |  0.44<L>    Ca    7.8<L>      28 May 2018 14:35  Mg     1.9     05-28    TPro  7.0  /  Alb  2.6<L>  /  TBili  0.6  /  DBili  x   /  AST  22  /  ALT  11  /  AlkPhos  176<H>  05-28          RADIOLOGY & ADDITIONAL STUDIES:

## 2018-05-28 NOTE — ED PROVIDER NOTE - PHYSICAL EXAMINATION
VITALS: reviewed  GEN: NAD, A & O x 4  HEAD/EYES: NCAT, PERRL, EOMI, anicteric sclerae, no conjunctival pallor  ENT: mucus membranes moist, oropharynx WNL, trachea midline, no JVD  RESP: lungs CTA with equal breath sounds bilaterally, chest wall nontender and atraumatic  CV: heart with reg rhythm S1, S2, no murmur; distal pulses intact and symmetric bilaterally  ABDOMEN: normoactive bowel sounds, soft, nondistended, nontender, no palpable masses  : no CVAT  MSK: extremities atraumatic and nontender, no edema, no asymmetry. the back is without midline or lateral tenderness, there is no spinal deformity or stepoff and the back is ranged painlessly. the neck has no midline tenderness, deformity, or stepoff, and is ranged painlessly.  SKIN: warm, dry, no rash, no bruising, no cyanosis. color appropriate for ethnicity  NEURO: alert, mentating appropriately, no facial asymmetry. gross sensation, motor, coordination are intact  PSYCH: Affect appropriate VITALS: reviewed  GEN: lethargic, ill appearing, A&Ox2  HEAD/EYES: NCAT, PERRL, EOMI, anicteric sclerae, no conjunctival pallor  ENT: mucus membranes moist, oropharynx WNL, trachea midline, no JVD  RESP: lungs CTA with equal breath sounds bilaterally, chest wall nontender and atraumatic  CV: tachypneic   ABDOMEN: normoactive bowel sounds, soft, nondistended, nontender, no palpable masses  : no CVAT  MSK: extremities atraumatic and nontender, no edema, no asymmetry. the back is without midline or lateral tenderness, there is no spinal deformity or stepoff and the back is ranged painlessly. the neck has no midline tenderness, deformity, or stepoff, and is ranged painlessly.  SKIN: warm, dry, no rash, no bruising, no cyanosis. color appropriate for ethnicity  NEURO: alert, mentating appropriately, no facial asymmetry. gross sensation, motor, coordination are intact  PSYCH: Affect appropriate VITALS: reviewed  GEN: lethargic, ill appearing, A&Ox2  HEAD/EYES: NCAT, PERRL, EOMI, anicteric sclerae, no conjunctival pallor  ENT: mucus membranes moist, oropharynx WNL, trachea midline, no JVD  RESP: fine crackles diffusely EUNICE, no wheezing, clear air movement  CV: tachypneic   ABDOMEN: normoactive bowel sounds, soft, nondistended, nontender, no palpable masses  : no CVAT  MSK: extremities atraumatic and nontender, no edema, no asymmetry. the back is without midline or lateral tenderness, there is no spinal deformity or stepoff and the back is ranged painlessly. the neck has no midline tenderness, deformity, or stepoff, and is ranged painlessly.  SKIN: warm, dry, no rash, no bruising, no cyanosis. color appropriate for ethnicity  NEURO: alert, mentating appropriately, no facial asymmetry. gross sensation, motor, coordination are intact  PSYCH: Affect appropriate VITALS: reviewed  GEN: lethargic, ill appearing, A&Ox2  HEAD/EYES: NCAT, PERRL, EOMI, anicteric sclerae, no conjunctival pallor  ENT: mucus membranes moist, oropharynx WNL, trachea midline, no JVD  RESP: fine crackles diffusely EUNICE, no wheezing, clear air movement  CV: tachypneic   ABDOMEN: normoactive bowel sounds, soft, nondistended, nontender, no palpable masses  : no CVAT  MSK: extremities atraumatic and nontender, no edema, no asymmetry. the back is without midline or lateral tenderness, there is no spinal deformity or stepoff and the back is ranged painlessly. the neck has no midline tenderness, deformity, or stepoff, and is ranged painlessly.  SKIN: warm, dry, no rash, no bruising, no cyanosis. color appropriate for ethnicity  NEURO: lethargic, answering questions, obeying commands, mentating appropriately, no facial asymmetry. gross sensation, motor, coordination are intact GEN: lethargic, ill appearing, A&Ox2  HEAD/EYES: NCAT, PERRL, EOMI, anicteric sclerae, +conjunctival pallor  ENT: mucus membranes moist, oropharynx WNL, trachea midline, no JVD  RESP: fine crackles diffusely EUNICE, no wheezing, clear air movement  CV: tachypneic   ABDOMEN: normoactive bowel sounds, soft, nondistended, nontender, no palpable masses, rectal exam with light brown stool, no gross blood. emesis bag with yellow vomit  : no CVAT  MSK: extremities atraumatic and nontender, no edema, no asymmetry. the back is without midline or lateral tenderness, there is no spinal deformity or stepoff and the back is ranged painlessly. the neck has no midline tenderness, deformity, or stepoff, and is ranged painlessly.  SKIN: warm, dry, no rash, no bruising, no cyanosis. somewhat pale for ethnicity  NEURO: lethargic, answering questions, obeying commands, no facial asymmetry. gross sensation, motor, coordination are intact

## 2018-05-28 NOTE — H&P ADULT - PROBLEM SELECTOR PLAN 4
supportive care  npo  consider D5/NS if needed for hypoglycemia if family wants to continue HISS and accuchecks, will hold for now after discussion with wife at bedside, would like to further discuss with son and hospice team in AM

## 2018-05-28 NOTE — ED PROVIDER NOTE - MEDICAL DECISION MAKING DETAILS
- C/w HD on M/W/F  - Appreciate Renal input Vomiting possible blood, appears in shock, differential DKA and sepsis, will start fluid resuscitation, no gross blood on initial exam will type and cross, transfuse blood, administer antibiotics, prn. Vomiting possible blood, appears in shock, differential DKA and sepsis, will start fluid resuscitation, no gross blood on initial exam will type and cross, transfuse blood, administer antibiotics, prn. CUrrently protecting airway and while tachypneic is breathing stably. do not think intubation required and potentially harmful at this juncture

## 2018-05-28 NOTE — ED PROVIDER NOTE - OBJECTIVE STATEMENT
68 y/o M pt hx prostate cancer, IDDM, presents to the ED BIBA c/o vomiting blood constantly since last night. Last vomited at 12:15 today, aide confirms vomit contained gross amounts of blood. He notes that he is SOB and previously took Coumadin due to a blood clot. As per EMS he has had increasingly poor mental status starting since last night, and was hyperglycemic and tachycardic. Wife confirms that he is confused but not altered at baseline and is undergoing chemotherapy for prostate cancer. When asked, pt states he knows he is in the hospital but does not know which. HPI limited due to pt's medical condition. HPI limited due to pt's medical condition. 66 y/o M pt hx prostate cancer, IDDM, presents to the ED BIBA c/o vomiting blood constantly since last night. A&Ox2. Pt complains of shoulder pain and is confused and not fully aware where he is. Last vomited at 12:15 today, aide confirms vomit contained blood. He notes that he is SOB and previously took Coumadin due to a blood clot. As per EMS he has had increasingly poor mental status starting since last night, and was hyperglycemic and tachycardic, finger stick reading >400. Aide confirms that he is confused but not altered at baseline and is undergoing chemotherapy for prostate cancer. Sister of pt mentioned that she was feeding him a smoothie with mangos and strawberries and saw red streaks in his vomit and he began to appear lethargic.  Rectal temp 99, light brown stool no melena. Denies hx of GI bleeds.

## 2018-05-29 DIAGNOSIS — E11.9 TYPE 2 DIABETES MELLITUS WITHOUT COMPLICATIONS: ICD-10-CM

## 2018-05-29 DIAGNOSIS — G93.41 METABOLIC ENCEPHALOPATHY: ICD-10-CM

## 2018-05-29 LAB
ALBUMIN SERPL ELPH-MCNC: 1.9 G/DL — LOW (ref 3.3–5.2)
ALP SERPL-CCNC: 162 U/L — HIGH (ref 40–120)
ALT FLD-CCNC: 9 U/L — SIGNIFICANT CHANGE UP
ANION GAP SERPL CALC-SCNC: 16 MMOL/L — SIGNIFICANT CHANGE UP (ref 5–17)
ANISOCYTOSIS BLD QL: SLIGHT — SIGNIFICANT CHANGE UP
ANISOCYTOSIS BLD QL: SLIGHT — SIGNIFICANT CHANGE UP
APPEARANCE UR: CLEAR — SIGNIFICANT CHANGE UP
AST SERPL-CCNC: 18 U/L — SIGNIFICANT CHANGE UP
BACTERIA # UR AUTO: ABNORMAL
BASOPHILS # BLD AUTO: 0 K/UL — SIGNIFICANT CHANGE UP (ref 0–0.2)
BASOPHILS NFR BLD AUTO: 0.2 % — SIGNIFICANT CHANGE UP (ref 0–2)
BILIRUB SERPL-MCNC: 0.8 MG/DL — SIGNIFICANT CHANGE UP (ref 0.4–2)
BILIRUB UR-MCNC: NEGATIVE — SIGNIFICANT CHANGE UP
BUN SERPL-MCNC: 3 MG/DL — LOW (ref 8–20)
CALCIUM SERPL-MCNC: 7.7 MG/DL — LOW (ref 8.6–10.2)
CHLORIDE SERPL-SCNC: 100 MMOL/L — SIGNIFICANT CHANGE UP (ref 98–107)
CO2 SERPL-SCNC: 18 MMOL/L — LOW (ref 22–29)
COLOR SPEC: YELLOW — SIGNIFICANT CHANGE UP
CREAT SERPL-MCNC: 0.25 MG/DL — LOW (ref 0.5–1.3)
DIFF PNL FLD: ABNORMAL
EOSINOPHIL # BLD AUTO: 0.1 K/UL — SIGNIFICANT CHANGE UP (ref 0–0.5)
EOSINOPHIL NFR BLD AUTO: 2 % — SIGNIFICANT CHANGE UP (ref 0–6)
EOSINOPHIL NFR BLD AUTO: 2 % — SIGNIFICANT CHANGE UP (ref 0–6)
EPI CELLS # UR: SIGNIFICANT CHANGE UP
GIANT PLATELETS BLD QL SMEAR: PRESENT — SIGNIFICANT CHANGE UP
GLUCOSE SERPL-MCNC: 194 MG/DL — HIGH (ref 70–115)
GLUCOSE UR QL: 250 MG/DL
HCT VFR BLD CALC: 19.9 % — CRITICAL LOW (ref 42–52)
HCT VFR BLD CALC: 23.5 % — LOW (ref 42–52)
HGB BLD-MCNC: 6.9 G/DL — CRITICAL LOW (ref 14–18)
HGB BLD-MCNC: 7.9 G/DL — LOW (ref 14–18)
HYPERCHROMIA BLD QL AUTO: SLIGHT — SIGNIFICANT CHANGE UP
HYPOCHROMIA BLD QL: SLIGHT — SIGNIFICANT CHANGE UP
HYPOCHROMIA BLD QL: SLIGHT — SIGNIFICANT CHANGE UP
KETONES UR-MCNC: ABNORMAL
LEUKOCYTE ESTERASE UR-ACNC: ABNORMAL
LYMPHOCYTES # BLD AUTO: 0.7 K/UL — LOW (ref 1–4.8)
LYMPHOCYTES # BLD AUTO: 14 % — LOW (ref 20–55)
LYMPHOCYTES # BLD AUTO: 5 % — LOW (ref 20–55)
MACROCYTES BLD QL: SLIGHT — SIGNIFICANT CHANGE UP
MACROCYTES BLD QL: SLIGHT — SIGNIFICANT CHANGE UP
MAGNESIUM SERPL-MCNC: 1.9 MG/DL — SIGNIFICANT CHANGE UP (ref 1.6–2.6)
MCHC RBC-ENTMCNC: 31 PG — SIGNIFICANT CHANGE UP (ref 27–31)
MCHC RBC-ENTMCNC: 31.5 PG — HIGH (ref 27–31)
MCHC RBC-ENTMCNC: 33.6 G/DL — SIGNIFICANT CHANGE UP (ref 32–36)
MCHC RBC-ENTMCNC: 34.7 G/DL — SIGNIFICANT CHANGE UP (ref 32–36)
MCV RBC AUTO: 90.9 FL — SIGNIFICANT CHANGE UP (ref 80–94)
MCV RBC AUTO: 92.2 FL — SIGNIFICANT CHANGE UP (ref 80–94)
MICROCYTES BLD QL: SLIGHT — SIGNIFICANT CHANGE UP
MICROCYTES BLD QL: SLIGHT — SIGNIFICANT CHANGE UP
MONOCYTES # BLD AUTO: 0.6 K/UL — SIGNIFICANT CHANGE UP (ref 0–0.8)
MONOCYTES NFR BLD AUTO: 13 % — HIGH (ref 3–10)
MONOCYTES NFR BLD AUTO: 2 % — LOW (ref 3–10)
NEUTROPHILS # BLD AUTO: 3.6 K/UL — SIGNIFICANT CHANGE UP (ref 1.8–8)
NEUTROPHILS NFR BLD AUTO: 68 % — SIGNIFICANT CHANGE UP (ref 37–73)
NEUTROPHILS NFR BLD AUTO: 90 % — HIGH (ref 37–73)
NEUTS BAND # BLD: 1 % — SIGNIFICANT CHANGE UP (ref 0–8)
NEUTS BAND # BLD: 3 % — SIGNIFICANT CHANGE UP (ref 0–8)
NITRITE UR-MCNC: NEGATIVE — SIGNIFICANT CHANGE UP
NRBC # BLD: 2 /100 — HIGH (ref 0–0)
OVALOCYTES BLD QL SMEAR: SLIGHT — SIGNIFICANT CHANGE UP
OVALOCYTES BLD QL SMEAR: SLIGHT — SIGNIFICANT CHANGE UP
PH UR: 6.5 — SIGNIFICANT CHANGE UP (ref 5–8)
PHOSPHATE SERPL-MCNC: 2.8 MG/DL — SIGNIFICANT CHANGE UP (ref 2.4–4.7)
PLAT MORPH BLD: NORMAL — SIGNIFICANT CHANGE UP
PLAT MORPH BLD: SIGNIFICANT CHANGE UP
PLATELET # BLD AUTO: 116 K/UL — LOW (ref 150–400)
PLATELET # BLD AUTO: 121 K/UL — LOW (ref 150–400)
POIKILOCYTOSIS BLD QL AUTO: SLIGHT — SIGNIFICANT CHANGE UP
POIKILOCYTOSIS BLD QL AUTO: SLIGHT — SIGNIFICANT CHANGE UP
POLYCHROMASIA BLD QL SMEAR: SIGNIFICANT CHANGE UP
POTASSIUM SERPL-MCNC: 3.4 MMOL/L — LOW (ref 3.5–5.3)
POTASSIUM SERPL-SCNC: 3.4 MMOL/L — LOW (ref 3.5–5.3)
PROT SERPL-MCNC: 6.3 G/DL — LOW (ref 6.6–8.7)
PROT UR-MCNC: 30 MG/DL
RBC # BLD: 2.19 M/UL — LOW (ref 4.6–6.2)
RBC # BLD: 2.55 M/UL — LOW (ref 4.6–6.2)
RBC # FLD: 24.2 % — HIGH (ref 11–15.6)
RBC # FLD: 25.1 % — HIGH (ref 11–15.6)
RBC BLD AUTO: ABNORMAL
RBC BLD AUTO: ABNORMAL
RBC CASTS # UR COMP ASSIST: SIGNIFICANT CHANGE UP /HPF (ref 0–4)
SODIUM SERPL-SCNC: 134 MMOL/L — LOW (ref 135–145)
SP GR SPEC: 1 — LOW (ref 1.01–1.02)
SPHEROCYTES BLD QL SMEAR: SLIGHT — SIGNIFICANT CHANGE UP
UROBILINOGEN FLD QL: 1 MG/DL
WBC # BLD: 5 K/UL — SIGNIFICANT CHANGE UP (ref 4.8–10.8)
WBC # BLD: 6.2 K/UL — SIGNIFICANT CHANGE UP (ref 4.8–10.8)
WBC # FLD AUTO: 5 K/UL — SIGNIFICANT CHANGE UP (ref 4.8–10.8)
WBC # FLD AUTO: 6.2 K/UL — SIGNIFICANT CHANGE UP (ref 4.8–10.8)
WBC UR QL: SIGNIFICANT CHANGE UP

## 2018-05-29 PROCEDURE — 99233 SBSQ HOSP IP/OBS HIGH 50: CPT

## 2018-05-29 PROCEDURE — 99232 SBSQ HOSP IP/OBS MODERATE 35: CPT

## 2018-05-29 RX ORDER — POTASSIUM CHLORIDE 20 MEQ
10 PACKET (EA) ORAL
Qty: 0 | Refills: 0 | Status: COMPLETED | OUTPATIENT
Start: 2018-05-29 | End: 2018-05-29

## 2018-05-29 RX ADMIN — ONDANSETRON 4 MILLIGRAM(S): 8 TABLET, FILM COATED ORAL at 12:42

## 2018-05-29 RX ADMIN — SODIUM CHLORIDE 75 MILLILITER(S): 9 INJECTION INTRAMUSCULAR; INTRAVENOUS; SUBCUTANEOUS at 12:44

## 2018-05-29 RX ADMIN — SODIUM CHLORIDE 75 MILLILITER(S): 9 INJECTION INTRAMUSCULAR; INTRAVENOUS; SUBCUTANEOUS at 04:59

## 2018-05-29 RX ADMIN — Medication 100 MILLIEQUIVALENT(S): at 19:09

## 2018-05-29 RX ADMIN — Medication 100 MILLIEQUIVALENT(S): at 17:36

## 2018-05-29 NOTE — GOALS OF CARE CONVERSATION - PERSONAL ADVANCE DIRECTIVE - CONVERSATION DETAILS
Hospice services discussed with patient's son and spouse via the telephone. Inpatient hospice options discussed. Meeting scheduled with spouse for 1:30 today to discuss hospice and explained hospice consents. Spouse was unable to make the meeting but she will try to come to the hospital tomorrow. Hospice contact information given to spouse. Dr Houston made aware. Gil CALI

## 2018-05-29 NOTE — PROGRESS NOTE ADULT - SUBJECTIVE AND OBJECTIVE BOX
Pt seen and examined. He has had no evidence of active GI bleeding here in the ED and had no further episodes of hematemesis here. CTA negative and he was transfused to a Hb of 7.9 grams. Family has made him Comfort Care. Head CT here shows possible brain mets. Pt's mental status remains altered.    REVIEW OF SYSTEMS:  Remains unobtainable secondary pt's AMS.      MEDICATIONS:  MEDICATIONS  (STANDING):  sodium chloride 0.9%. 1000 milliLiter(s) (75 mL/Hr) IV Continuous <Continuous>    MEDICATIONS  (PRN):  HYDROmorphone  Injectable 0.5 milliGRAM(s) IV Push every 6 hours PRN Moderate Pain (4 - 6)  ondansetron Injectable 4 milliGRAM(s) IV Push every 6 hours PRN Nausea      Allergies    No Known Allergies    Intolerances        Vital Signs Last 24 Hrs  T(C): 36.1 (29 May 2018 07:27), Max: 36.7 (28 May 2018 13:50)  T(F): 97 (29 May 2018 07:27), Max: 98.1 (28 May 2018 13:50)  HR: 100 (29 May 2018 07:27) (57 - 111)  BP: 75/47 (29 May 2018 07:27) (75/47 - 114/56)  BP(mean): --  RR: 24 (29 May 2018 07:27) (24 - 40)  SpO2: 98% (29 May 2018 07:27) (92% - 100%)     @ 07:01  -   @ 07:00  --------------------------------------------------------  IN: 0 mL / OUT: 1600 mL / NET: -1600 mL        PHYSICAL EXAM:    General: Well developed; ill appearing in no acute distress but altered when seen.  HEENT: MMM, conjunctiva pink and sclera anicteric  Lungs: Decreased breath sounds bilaterally.  Cor: RRR S1, S2 only.  Gastrointestinal: Abdomen: Soft non-tender non-distended; Normal bowel sounds; No hepatosplenomegaly.  Extremities: no cyanosis, clubbing or edema.  Neuro: Pt. alert but altered.    LABS:  CBC Full  -  ( 29 May 2018 00:17 )  WBC Count : 6.2 K/uL  Hemoglobin : 7.9 g/dL  Hematocrit : 23.5 %  Platelet Count - Automated : 121 K/uL  Mean Cell Volume : 92.2 fl  Mean Cell Hemoglobin : 31.0 pg  Mean Cell Hemoglobin Concentration : 33.6 g/dL  Auto Neutrophil # : x  Auto Lymphocyte # : x  Auto Monocyte # : x  Auto Eosinophil # : x  Auto Basophil # : x  Auto Neutrophil % : 68.0 %  Auto Lymphocyte % : 14.0 %  Auto Monocyte % : 13.0 %  Auto Eosinophil % : 2.0 %  Auto Basophil % : x        128<L>  |  91<L>  |  7.0<L>  ----------------------------<  399<H>  4.1   |  20.0<L>  |  0.44<L>    Ca    7.8<L>      28 May 2018 14:35  Mg     1.9         TPro  7.0  /  Alb  2.6<L>  /  TBili  0.6  /  DBili  x   /  AST  22  /  ALT  11  /  AlkPhos  176<H>      PT/INR - ( 28 May 2018 14:35 )   PT: 16.2 sec;   INR: 1.46 ratio         PTT - ( 28 May 2018 14:35 )  PTT:29.0 sec      Urinalysis Basic - ( 29 May 2018 01:56 )    Color: Yellow / Appearance: Clear / S.005 / pH: x  Gluc: x / Ketone: Trace  / Bili: Negative / Urobili: 1 mg/dL   Blood: x / Protein: 30 mg/dL / Nitrite: Negative   Leuk Esterase: Trace / RBC: 0-2 /HPF / WBC 3-5   Sq Epi: x / Non Sq Epi: Occasional / Bacteria: Occasional                RADIOLOGY & ADDITIONAL STUDIES (The following images were personally reviewed): CTA and Head CT results noted.

## 2018-05-29 NOTE — PROGRESS NOTE ADULT - ASSESSMENT
Pt is a 68 yo Male with a pmh/o afib previously on coumadin and advanced stage 4 metastatic Prostate CA who was referred to hospice but opted for home care in past, who is no longer on treatment, presents to ED with family for rapid decline in PO intake, mentation, and poor quality of life. Pt had questionable episode of hematemesis at home after strawberry shake. IN ED noted ot have hemoglobin <4, hypotensive with altered mental status. Pt was seen by GI and MICU and MICU team had an extensive conversation with family (Wife /Son) as documented in MICU note- they requested DNR /DNI and comfort measures, denied any aggressive intervention hence deemed not MICU candidate and admitted to hospitalist service for acute symptomatic severe anemia likely GI bleeding with hemodynamically instability.    Acute symptomatic severe anemia likely GI bleeding:  - S/p 2 units pRBC transfusion, hgb 7.9 now. Persistently hypotensive- c/w IVFs.  - GI /MICU note reviewed. No Aggressive intervention per family, comfort care.  - Hospice / Palliative consulted for hospice inn.     Metabolic encephelopathy  - Due to severe anemia and metastatic brain cancer.   - On Comfort care,  family to meet hospice team today.     Metastatic prostate cancer:  - Comfort care as above. Pt is a 68 yo Male with a pmh/o afib previously on coumadin and advanced stage 4 metastatic Prostate CA who was referred to hospice but opted for home care in past, who is no longer on treatment, presents to ED with family for rapid decline in PO intake, mentation, and poor quality of life. Pt had questionable episode of hematemesis at home after strawberry shake. IN ED noted ot have hemoglobin <4, hypotensive with altered mental status. Pt was seen by GI and MICU and MICU team had an extensive conversation with family (Wife /Son) as documented in MICU note- they requested DNR /DNI and comfort measures, denied any aggressive intervention hence deemed not MICU candidate and admitted to hospitalist service for acute symptomatic severe anemia likely GI bleeding with hemodynamically instability.    Acute symptomatic severe anemia likely GI bleeding:  - S/p 2 units pRBC transfusion, hgb 7.9 now. Persistently hypotensive- c/w IVFs.  - GI /MICU note reviewed. No Aggressive intervention per family, comfort care.  - Hospice / Palliative consulted for hospice inn.     Metabolic encephelopathy  - Due to severe anemia and metastatic brain cancer.   - On Comfort care,  family to meet hospice team today.     Metastatic prostate cancer:  - Comfort care as above.     Addendum- I spoke to Pt's wife over the phone, She reported she will try to come today or tomorrow to meet with hospice team. Pt's son already met with hospice team and willing for hospice inn with comfort care. Pt's wife to meet hospice tomorrow. She confirmed Pt remains DNR / DNI and comfort care. Ok to give 1 more unit pRBCs.

## 2018-05-29 NOTE — ED ADULT NURSE REASSESSMENT NOTE - COMFORT CARE
plan of care explained
side rails up/warm blanket provided/repositioned/plan of care explained
plan of care explained/darkened lights/po fluids offered/repositioned

## 2018-05-29 NOTE — ED ADULT NURSE REASSESSMENT NOTE - TEMPLATE LIST FOR HEAD TO TOE ASSESSMENT
Abdominal Pain, N/V/D

## 2018-05-29 NOTE — ED ADULT NURSE REASSESSMENT NOTE - GENERAL PATIENT STATE
comfortable appearance/resting/sleeping
resting/sleeping
resting/sleeping/family/SO at bedside
resting/sleeping

## 2018-05-29 NOTE — PROGRESS NOTE ADULT - SUBJECTIVE AND OBJECTIVE BOX
ANGELIC DELGADO Male 67y [id number]    Patient is a 67y old  Male who presents with a chief complaint of vomiting blood, weakness (28 May 2018 21:38)      On service note:  Pt seen and examined at bedside, admitted overnight for GI bleeding with hemoglobin <4. He is lethargic but known his name, UNA, known he is in the hospital. S/p 2 unit pRBC transfusion last night with hgb improved to 7.9.  Pt was seen by MICU last night and family requested full comfort with no aggressive measure. GO note reviewed. Hospice /Palliative consulted.       Review of system:  Limited but No fever, chills, nausea, vomiting.      PHYSICAL EXAM:    Vital Signs Last 24 Hrs  T(C): 36.1 (29 May 2018 07:27), Max: 36.7 (28 May 2018 13:50)  T(F): 97 (29 May 2018 07:27), Max: 98.1 (28 May 2018 13:50)  HR: 100 (29 May 2018 07:27) (57 - 111)  BP: 75/47 (29 May 2018 07:27) (75/47 - 114/56)  BP(mean): --  RR: 24 (29 May 2018 07:27) (24 - 40)  SpO2: 98% (29 May 2018 07:27) (92% - 100%)    GENERAL: Pt ill looking, NAD. Lethargic, follows commands.   CHEST/LUNG: Clear to auscultation bilaterally; No wheezing.  HEART: S1S2+, Regular rate and rhythm; No murmurs.  ABDOMEN: Soft, Nontender, Nondistended; Bowel sounds present.  Extremities: No LE edema.  NEURO: AAOX2, no focal deficits, follows simple commands.       MEDICATIONS  (STANDING):  sodium chloride 0.9%. 1000 milliLiter(s) (75 mL/Hr) IV Continuous <Continuous>    MEDICATIONS  (PRN):  HYDROmorphone  Injectable 0.5 milliGRAM(s) IV Push every 6 hours PRN Moderate Pain (4 - 6)  ondansetron Injectable 4 milliGRAM(s) IV Push every 6 hours PRN Nausea        Labs:  LABS:                        7.9    6.2   )-----------( 121      ( 29 May 2018 00:17 )             23.5     05-28    128<L>  |  91<L>  |  7.0<L>  ----------------------------<  399<H>  4.1   |  20.0<L>  |  0.44<L>    Ca    7.8<L>      28 May 2018 14:35  Mg     1.9         TPro  7.0  /  Alb  2.6<L>  /  TBili  0.6  /  DBili  x   /  AST  22  /  ALT  11  /  AlkPhos  176<H>      PT/INR - ( 28 May 2018 14:35 )   PT: 16.2 sec;   INR: 1.46 ratio         PTT - ( 28 May 2018 14:35 )  PTT:29.0 sec    LIVER FUNCTIONS - ( 28 May 2018 14:35 )  Alb: 2.6 g/dL / Pro: 7.0 g/dL / ALK PHOS: 176 U/L / ALT: 11 U/L / AST: 22 U/L / GGT: x           Urinalysis Basic - ( 29 May 2018 01:56 )    Color: Yellow / Appearance: Clear / S.005 / pH: x  Gluc: x / Ketone: Trace  / Bili: Negative / Urobili: 1 mg/dL   Blood: x / Protein: 30 mg/dL / Nitrite: Negative   Leuk Esterase: Trace / RBC: 0-2 /HPF / WBC 3-5   Sq Epi: x / Non Sq Epi: Occasional / Bacteria: Occasional

## 2018-05-30 LAB
CULTURE RESULTS: NO GROWTH — SIGNIFICANT CHANGE UP
HCT VFR BLD CALC: 21.1 % — LOW (ref 42–52)
HGB BLD-MCNC: 7.1 G/DL — LOW (ref 14–18)
SPECIMEN SOURCE: SIGNIFICANT CHANGE UP

## 2018-05-30 PROCEDURE — 99232 SBSQ HOSP IP/OBS MODERATE 35: CPT

## 2018-05-30 RX ADMIN — ONDANSETRON 4 MILLIGRAM(S): 8 TABLET, FILM COATED ORAL at 10:18

## 2018-05-30 RX ADMIN — HYDROMORPHONE HYDROCHLORIDE 0.5 MILLIGRAM(S): 2 INJECTION INTRAMUSCULAR; INTRAVENOUS; SUBCUTANEOUS at 10:17

## 2018-05-30 RX ADMIN — SODIUM CHLORIDE 75 MILLILITER(S): 9 INJECTION INTRAMUSCULAR; INTRAVENOUS; SUBCUTANEOUS at 10:17

## 2018-05-30 RX ADMIN — Medication 100 MILLIEQUIVALENT(S): at 00:21

## 2018-05-30 RX ADMIN — HYDROMORPHONE HYDROCHLORIDE 0.5 MILLIGRAM(S): 2 INJECTION INTRAMUSCULAR; INTRAVENOUS; SUBCUTANEOUS at 11:00

## 2018-05-30 RX ADMIN — SODIUM CHLORIDE 75 MILLILITER(S): 9 INJECTION INTRAMUSCULAR; INTRAVENOUS; SUBCUTANEOUS at 00:23

## 2018-05-30 NOTE — PROGRESS NOTE ADULT - ASSESSMENT
Pt is a 68 yo Male with a pmh/o afib previously on coumadin and advanced stage 4 metastatic Prostate CA who was referred to hospice but opted for home care in past, who is no longer on treatment, presents to ED with family for rapid decline in PO intake, mentation, and poor quality of life. Pt had questionable episode of hematemesis at home after strawberry shake. IN ED noted ot have hemoglobin <4, hypotensive with altered mental status. Pt was seen by GI and MICU and MICU team had an extensive conversation with family (Wife /Son) as documented in MICU note- they requested DNR /DNI and comfort measures, denied any aggressive intervention hence deemed not MICU candidate and admitted to hospitalist service for acute symptomatic severe anemia likely GI bleeding with hemodynamically instability.  5/30- I spoke to Pt's wife Mrs Collier Royal and discussed the GOC at length including daily labs, feeding tube, blood transfusion- She states Pt should remain on comfort care, no daily labs / blood transfusion and no feeding tube, DNR /I. She further stated she will try to bring family tomorrow to meet with hospice team to decide on hospice inn.     Acute symptomatic severe anemia likely GI bleeding:  - S/p 3 units pRBC transfusion, hgb 7.1 now. Persistently hypotensive- c/w IVFs.  - GI /MICU note reviewed.   - No Aggressive intervention per family, comfort care, avoid daily labs.  - Hospice on board, pending family meeting prior to decide on hospice inn. - Palliative consulted pending eval. .     Metabolic encephelopathy  - Due to severe anemia and metastatic brain cancer.   - On Comfort care,  family to meet hospice team today.     Metastatic prostate cancer:  - Comfort care as above.

## 2018-05-30 NOTE — PROGRESS NOTE ADULT - SUBJECTIVE AND OBJECTIVE BOX
ANGELIC DELGADO Male 67y [id number]    Patient is a 67y old  Male who presents with a chief complaint of vomiting blood, weakness (28 May 2018 21:38)    Subjective /Objective:  Pt seen and examined at bedside, no over night event reported by night staff. Pt reports doing well and has no new complaints.    Review of system:  No fever, chills, nausea, vomiting, headache, dizziness, chest pain, SOB or palpitation.      PHYSICAL EXAM:    Vital Signs Last 24 Hrs  T(C): 36.7 (30 May 2018 13:29), Max: 37.4 (29 May 2018 16:47)  T(F): 98.1 (30 May 2018 13:29), Max: 99.3 (29 May 2018 16:47)  HR: 104 (30 May 2018 10:24) (100 - 111)  BP: 97/64 (30 May 2018 08:00) (87/50 - 104/72)  BP(mean): 73 (30 May 2018 08:00) (71 - 82)  RR: 30 (30 May 2018 08:00) (16 - 30)  SpO2: 100% (30 May 2018 08:00) (97% - 100%)    GENERAL: Pt lying comfortably, NAD.  ENMT: PERRL, +EOMI.  NECK: soft, Supple, No JVD,   CHEST/LUNG: Clear to auscultatation bilaterally; No wheezing.  HEART: S1S2+, Regular rate and rhythm; No murmurs.  ABDOMEN: Soft, Nontender, Nondistended; Bowel sounds present.  MUSCULOSKELETAL: Normal range of motion.  SKIN: No rashes or lesions.  NEURO: AAOX3, no focal deficits, no motor r sensory loss.  PSYCH: normal mood.          MEDICATIONS  (STANDING):  sodium chloride 0.9%. 1000 milliLiter(s) (75 mL/Hr) IV Continuous <Continuous>    MEDICATIONS  (PRN):  HYDROmorphone  Injectable 0.5 milliGRAM(s) IV Push every 6 hours PRN Moderate Pain (4 - 6)  ondansetron Injectable 4 milliGRAM(s) IV Push every 6 hours PRN Nausea        Labs:  LABS:                        7.1    x     )-----------( x        ( 30 May 2018 01:33 )             21.1     05-29    134<L>  |  100  |  3.0<L>  ----------------------------<  194<H>  3.4<L>   |  18.0<L>  |  0.25<L>    Ca    7.7<L>      29 May 2018 08:48  Phos  2.8       Mg     1.9         TPro  6.3<L>  /  Alb  1.9<L>  /  TBili  0.8  /  DBili  x   /  AST  18  /  ALT  9   /  AlkPhos  162<H>      PT/INR - ( 28 May 2018 14:35 )   PT: 16.2 sec;   INR: 1.46 ratio         PTT - ( 28 May 2018 14:35 )  PTT:29.0 sec    LIVER FUNCTIONS - ( 29 May 2018 08:48 )  Alb: 1.9 g/dL / Pro: 6.3 g/dL / ALK PHOS: 162 U/L / ALT: 9 U/L / AST: 18 U/L / GGT: x           Urinalysis Basic - ( 29 May 2018 01:56 )    Color: Yellow / Appearance: Clear / S.005 / pH: x  Gluc: x / Ketone: Trace  / Bili: Negative / Urobili: 1 mg/dL   Blood: x / Protein: 30 mg/dL / Nitrite: Negative   Leuk Esterase: Trace / RBC: 0-2 /HPF / WBC 3-5   Sq Epi: x / Non Sq Epi: Occasional / Bacteria: Occasional ANGELIC DELGADO Male 67y [id number]    Patient is a 67y old  Male who presents with a chief complaint of vomiting blood, weakness (28 May 2018 21:38)    Subjective /Objective:  Pt seen and examined at bedside, no over night event reported by night staff. Pt with lethargy with severe anemia and metastatic cancer. Family wants comfort care, DNR /I. Hospice on board and wife wants to arrange family meeting prior to consent for hospice in.       Review of system:  Unable to obtain.       PHYSICAL EXAM:    Vital Signs Last 24 Hrs  T(C): 36.7 (30 May 2018 13:29), Max: 37.4 (29 May 2018 16:47)  T(F): 98.1 (30 May 2018 13:29), Max: 99.3 (29 May 2018 16:47)  HR: 104 (30 May 2018 10:24) (100 - 111)  BP: 97/64 (30 May 2018 08:00) (87/50 - 104/72)  BP(mean): 73 (30 May 2018 08:00) (71 - 82)  RR: 30 (30 May 2018 08:00) (16 - 30)  SpO2: 100% (30 May 2018 08:00) (97% - 100%)    GENERAL: Pt ill looking, NAD. Lethargic, follows commands.   CHEST/LUNG: Clear to auscultation bilaterally; No wheezing.  HEART: S1S2+, Regular rate and rhythm; No murmurs.  ABDOMEN: Soft, Nontender, Nondistended; Bowel sounds present.  Extremities: No LE edema.  NEURO: AAOX1, follows simple commands.         MEDICATIONS  (STANDING):  sodium chloride 0.9%. 1000 milliLiter(s) (75 mL/Hr) IV Continuous <Continuous>    MEDICATIONS  (PRN):  HYDROmorphone  Injectable 0.5 milliGRAM(s) IV Push every 6 hours PRN Moderate Pain (4 - 6)  ondansetron Injectable 4 milliGRAM(s) IV Push every 6 hours PRN Nausea        Labs:  LABS:                        7.1    x     )-----------( x        ( 30 May 2018 01:33 )             21.1     05-29    134<L>  |  100  |  3.0<L>  ----------------------------<  194<H>  3.4<L>   |  18.0<L>  |  0.25<L>    Ca    7.7<L>      29 May 2018 08:48  Phos  2.8       Mg     1.9         TPro  6.3<L>  /  Alb  1.9<L>  /  TBili  0.8  /  DBili  x   /  AST  18  /  ALT  9   /  AlkPhos  162<H>      PT/INR - ( 28 May 2018 14:35 )   PT: 16.2 sec;   INR: 1.46 ratio         PTT - ( 28 May 2018 14:35 )  PTT:29.0 sec    LIVER FUNCTIONS - ( 29 May 2018 08:48 )  Alb: 1.9 g/dL / Pro: 6.3 g/dL / ALK PHOS: 162 U/L / ALT: 9 U/L / AST: 18 U/L / GGT: x           Urinalysis Basic - ( 29 May 2018 01:56 )    Color: Yellow / Appearance: Clear / S.005 / pH: x  Gluc: x / Ketone: Trace  / Bili: Negative / Urobili: 1 mg/dL   Blood: x / Protein: 30 mg/dL / Nitrite: Negative   Leuk Esterase: Trace / RBC: 0-2 /HPF / WBC 3-5   Sq Epi: x / Non Sq Epi: Occasional / Bacteria: Occasional

## 2018-05-30 NOTE — GOALS OF CARE CONVERSATION - PERSONAL ADVANCE DIRECTIVE - CONVERSATION DETAILS
Spoke with spouse Royal she states  that before making the hospice decision she would like all her family  to meet with hospice with her so when a decision is made  there is no family second guessing her decision and then family discord. She wants to be sure she is making the right decision . she is reaching out to her son and other family she wishes to participate in meeting and will call hospice back with their availability . Hospice will remain available  , Dr medeiros aware

## 2018-05-30 NOTE — GOALS OF CARE CONVERSATION - PERSONAL ADVANCE DIRECTIVE - CONVERSATION DETAILS
spouse had asked me to check on pt visited with pt he is sleeping but easily arousable  a and o x1  to self but able to ask appropriate  questions  like where am I ? reoriented . Pt taking po but minimal . pt denies pain  appears comfortable  but does have intermittent nausea presently denies . Revisited with spouse again asked  if she had been able to secure a time and date for collaborative meeting . she states they are making decision and has my number and will let me know.

## 2018-05-30 NOTE — ED ADULT NURSE REASSESSMENT NOTE - NS ED NURSE REASSESS COMMENT FT1
3rd unit of PRBCs initiated at 1705, unit # [id number]. 2nd RN patient and blood verification done at the bedside with Susanna Flor RN. Pt aware to make this RN aware of any adverse effects of blood transfusion. Continuous VS monitoring in progress during blood transfusion. Wife remains at bedside, updated on plan of care.
Blood transfusion infusing as ordered, tolerating well. No adverse reactions noted, site asymptomatic. Pt in no apparent distress at this time, respirations even and unlabored. Wife remains at bedside. Pt provided with meal tray.
Patient completed PRBC blood transfusion, no adverse reaction noted.
Pt tolerating transfusion well, no adverse reaction noted at this time. Pt voices no complaints, Will continue to monitor and reassess.
Telephone order received from Dr. Berlin Houston, may advance diet to mechanical soft. Order read back and verified to MD. Wife at bedside and updated on plan of care. Preparing to infuse 3rd unit PRBCs, consent obtained by Dr. Houston and scanned into chart.
patient resting nasal o2 in use. iv infusing well. skin care given. third k= rider in progress. blood transfusion completed will monitor and chart changes patient remains on monitor in sinus tach will monitor a ndchart changes
Assuming care from previous RN, rendering care late due to care for critical pt, pt A&O x's 0, pt tachypneic, clear breath sounds B/L,  pt moaning but does not appear to be in pain at this time, showing NSR on monitor,  MD Lindsey at bedside speaking with family, family aware of plan of care, pt with patent 20G in RAC tolerating NS well, pt with mckeon in place draining clear, yellow urine, awaiting CT and admit bed, will continue to monitor
Pt resting comfortably on stretcher, resp tachypneic and unlabored, denies pain, offers no complaints, pt placed in hospital bed for comfort, awaiting admit bed, will continue to monitor
Spoke with MD Morrissey at 0800, made aware of pt's vitals and condition. No new orders at this time per MD. will continue to monitor and reassess closely.
Suction provided for comfort for pt, pt denies pain at this time, offers no other complaints, supervision contacted for hospital bed for comfort, awaiting admit bed, will continue to monitor
pt hypotensive, 85/55. MD Macdonald made aware. per MD, hold off on pain medication at this time, will continue with conservative treatment. okay to give NS 500mL bolus. MD orders noted and initiated.
Dr. Houston at the bedside, palliative consult placed and will re-evaluate pt's plan of care after consult completed. Pt resting quietly, per MD hold pain medicine at this time.
pt sleeping comfortably, in no apparent distress or discomfort. 2nd liter NS maintenance @ 75mL/hr initiated @ this time. pt awaiting bed, will continue to monitor.
VS completed. Pt resting comfortable with son at bedside. 0.9% NS infusing per MD order. Plan of care explained. Will continue to reassess and monitor. Dr. Houston paged to come obtained consent for blood transfusion.
Report rec'd from off-going RN at 0730. Pt rec'd sleeping on stretcher, easy to arouse. Pt c/o 5/10 pain to left shoulder. Pt hypotensive as noted on flow sheet, MD paged for notification. NS 0.9% infusing as per MD orders. Oxygen in place via NC at 3LPM, tolerating well. Pt with #20g in place to right ac, intact and patent, site asymptomatic. Pt showing SR on cardiac monitor with PVCs.  Pt updated on plan of care going forward, will continue to monitor and reassess.

## 2018-05-31 DIAGNOSIS — R11.2 NAUSEA WITH VOMITING, UNSPECIFIED: ICD-10-CM

## 2018-05-31 DIAGNOSIS — Z51.5 ENCOUNTER FOR PALLIATIVE CARE: ICD-10-CM

## 2018-05-31 DIAGNOSIS — R06.02 SHORTNESS OF BREATH: ICD-10-CM

## 2018-05-31 PROCEDURE — 99232 SBSQ HOSP IP/OBS MODERATE 35: CPT

## 2018-05-31 PROCEDURE — 99223 1ST HOSP IP/OBS HIGH 75: CPT

## 2018-05-31 RX ORDER — HYDROMORPHONE HYDROCHLORIDE 2 MG/ML
1 INJECTION INTRAMUSCULAR; INTRAVENOUS; SUBCUTANEOUS
Qty: 0 | Refills: 0 | Status: DISCONTINUED | OUTPATIENT
Start: 2018-05-31 | End: 2018-06-01

## 2018-05-31 RX ORDER — HYDROMORPHONE HYDROCHLORIDE 2 MG/ML
0.5 INJECTION INTRAMUSCULAR; INTRAVENOUS; SUBCUTANEOUS EVERY 6 HOURS
Qty: 0 | Refills: 0 | Status: DISCONTINUED | OUTPATIENT
Start: 2018-05-31 | End: 2018-06-05

## 2018-05-31 RX ORDER — ONDANSETRON 8 MG/1
4 TABLET, FILM COATED ORAL EVERY 6 HOURS
Qty: 0 | Refills: 0 | Status: DISCONTINUED | OUTPATIENT
Start: 2018-05-31 | End: 2018-06-10

## 2018-05-31 RX ADMIN — HYDROMORPHONE HYDROCHLORIDE 0.5 MILLIGRAM(S): 2 INJECTION INTRAMUSCULAR; INTRAVENOUS; SUBCUTANEOUS at 16:07

## 2018-05-31 RX ADMIN — ONDANSETRON 4 MILLIGRAM(S): 8 TABLET, FILM COATED ORAL at 18:04

## 2018-05-31 RX ADMIN — ONDANSETRON 4 MILLIGRAM(S): 8 TABLET, FILM COATED ORAL at 23:51

## 2018-05-31 RX ADMIN — HYDROMORPHONE HYDROCHLORIDE 0.5 MILLIGRAM(S): 2 INJECTION INTRAMUSCULAR; INTRAVENOUS; SUBCUTANEOUS at 17:49

## 2018-05-31 RX ADMIN — HYDROMORPHONE HYDROCHLORIDE 0.5 MILLIGRAM(S): 2 INJECTION INTRAMUSCULAR; INTRAVENOUS; SUBCUTANEOUS at 18:04

## 2018-05-31 RX ADMIN — HYDROMORPHONE HYDROCHLORIDE 0.5 MILLIGRAM(S): 2 INJECTION INTRAMUSCULAR; INTRAVENOUS; SUBCUTANEOUS at 23:51

## 2018-05-31 NOTE — CONSULT NOTE ADULT - ATTENDING COMMENTS
Thank you for the opportunity to assist with the care of this patient.   [facility] [phone].

## 2018-05-31 NOTE — PROGRESS NOTE ADULT - SUBJECTIVE AND OBJECTIVE BOX
ANGELIC DELGADO Male 67y [id number]    Patient is a 67y old  Male who presents with a chief complaint of vomiting blood, weakness (28 May 2018 21:38)      Subjective/objective:  Pt seen and examined at bedside, no over night event reported by night staff. Pt reports doing well and has no new complaints.    Review of system:  No fever, chills, nausea, vomiting, headache, dizziness, chest pain, SOB or palpitation.      PHYSICAL EXAM:    Vital Signs Last 24 Hrs  T(C): 36.9 (31 May 2018 08:54), Max: 37.4 (31 May 2018 04:57)  T(F): 98.5 (31 May 2018 08:54), Max: 99.3 (31 May 2018 04:57)  HR: 97 (31 May 2018 04:57) (97 - 104)  BP: 91/62 (31 May 2018 04:57) (91/62 - 96/60)  BP(mean): 72 (31 May 2018 04:57) (72 - 72)  RR: 22 (31 May 2018 04:57) (22 - 24)  SpO2: 100% (31 May 2018 04:57) (100% - 100%)    GENERAL: Pt lying comfortably, NAD.  ENMT: PERRL, +EOMI.  NECK: soft, Supple, No JVD,   CHEST/LUNG: Clear to auscultatation bilaterally; No wheezing.  HEART: S1S2+, Regular rate and rhythm; No murmurs.  ABDOMEN: Soft, Nontender, Nondistended; Bowel sounds present.  MUSCULOSKELETAL: Normal range of motion.  SKIN: No rashes or lesions.  NEURO: AAOX3, no focal deficits, no motor r sensory loss.  PSYCH: normal mood.          MEDICATIONS  (STANDING):  sodium chloride 0.9%. 1000 milliLiter(s) (75 mL/Hr) IV Continuous <Continuous>    MEDICATIONS  (PRN):  HYDROmorphone  Injectable 0.5 milliGRAM(s) IV Push every 6 hours PRN Moderate Pain (4 - 6)  ondansetron Injectable 4 milliGRAM(s) IV Push every 6 hours PRN Nausea        Labs:  LABS:                        7.1    x     )-----------( x        ( 30 May 2018 01:33 )             21.1 ANGELIC DELGADO Male 67y [id number]    Patient is a 67y old  Male who presents with a chief complaint of vomiting blood, weakness (28 May 2018 21:38)    Subjective /Objective:  Pt seen and examined at bedside, no over night event reported by night staff. Pt with lethargy with severe anemia and metastatic cancer. Today more awake, dows follow commands, known his , hiw wife name and known he is in hospital. Pt states he vomited last night. Hospice on board to arrange meeting with wife for hospice inn consent but wife unreachable today per hospice team.        Review of system:  Limited but no fever, chills, chest pain, SOB.      PHYSICAL EXAM:    Vital Signs Last 24 Hrs  T(C): 36.9 (31 May 2018 08:54), Max: 37.4 (31 May 2018 04:57)  T(F): 98.5 (31 May 2018 08:54), Max: 99.3 (31 May 2018 04:57)  HR: 97 (31 May 2018 04:57) (97 - 104)  BP: 91/62 (31 May 2018 04:57) (91/62 - 96/60)  BP(mean): 72 (31 May 2018 04:57) (72 - 72)  RR: 22 (31 May 2018 04:57) (22 - 24)  SpO2: 100% (31 May 2018 04:57) (100% - 100%)    GENERAL: Pt ill looking, NAD. Lethargic, follows commands.   CHEST/LUNG: Clear to auscultation bilaterally; No wheezing.  HEART: S1S2+, Regular rate and rhythm; No murmurs.  ABDOMEN: Soft, Nontender, Nondistended; Bowel sounds present.  Extremities: No LE edema.  NEURO: AAOX2, follows simple commands.         MEDICATIONS  (STANDING):  sodium chloride 0.9%. 1000 milliLiter(s) (75 mL/Hr) IV Continuous <Continuous>    MEDICATIONS  (PRN):  HYDROmorphone  Injectable 0.5 milliGRAM(s) IV Push every 6 hours PRN Moderate Pain (4 - 6)  ondansetron Injectable 4 milliGRAM(s) IV Push every 6 hours PRN Nausea        Labs:  LABS:                        7.1    x     )-----------( x        ( 30 May 2018 01:33 )             21.1

## 2018-05-31 NOTE — CONSULT NOTE ADULT - SUBJECTIVE AND OBJECTIVE BOX
HPI:    PERTINENT PMH REVIEWED: Yes No    PAST MEDICAL & SURGICAL HISTORY:  Prostate CA  Pulmonary embolism  DVT (deep venous thrombosis)  IDDM (insulin dependent diabetes mellitus)  No significant past surgical history      SOCIAL HISTORY:                                     Admitted from:  home  SNF  [facility]     Surrogate/HCP/Guardian: Phone#:    FAMILY HISTORY:  No pertinent family history in first degree relatives      Baseline ADLs (prior to admission):  Independent/ Dependent      Allergies    No Known Allergies    Intolerances        Present Symptoms:     Dyspnea: 0 1 2 3   Nausea/Vomiting: Yes No  Anxiety:  Yes No  Depression: Yes No  Fatigue: Yes No  Loss of appetite: Yes No    Pain:             Character-            Duration-            Effect-            Factors-            Frequency-            Location-            Severity-    Review of Systems: Reviewed                     Negative:                     Positive:  Unable to obtain due to poor mentation   All others negative    MEDICATIONS  (STANDING):    MEDICATIONS  (PRN):  HYDROmorphone  Injectable 0.5 milliGRAM(s) IV Push every 6 hours PRN Moderate Pain (4 - 6)  LORazepam     Tablet 0.5 milliGRAM(s) Oral every 6 hours PRN Nausea and/or Vomiting, or agitation/anxiety  ondansetron Injectable 4 milliGRAM(s) IV Push every 6 hours PRN Nausea      PHYSICAL EXAM:    Vital Signs Last 24 Hrs  T(C): 37.2 (31 May 2018 15:52), Max: 37.4 (31 May 2018 04:57)  T(F): 99 (31 May 2018 15:52), Max: 99.3 (31 May 2018 04:57)  HR: 104 (31 May 2018 15:52) (97 - 108)  BP: 97/61 (31 May 2018 15:52) (91/62 - 97/61)  BP(mean): 72 (31 May 2018 04:57) (72 - 72)  RR: 20 (31 May 2018 15:52) (20 - 24)  SpO2: 100% (31 May 2018 08:00) (100% - 100%)    General: alert  oriented x ____ lethargic agitated                  cachexia  nonverbal  coma    Karnofsky:  %    HEENT: normal  dry mouth  ET tube/trach    Lungs: comfortable tachypnea/labored breathing  excessive secretions    CV: normal  tachycardia    GI: normal  distended  tender  no BS               PEG/NG/OG tube  constipation  last BM:     : normal  incontinent  oliguria/anuria  mckeon    MSK: normal  weakness  edema             ambulatory  bedbound/wheelchair bound    Skin: normal  pressure ulcers- Stage_____  no rash    LABS:                        7.1    x     )-----------( x        ( 30 May 2018 01:33 )             21.1               I&O's Summary    30 May 2018 07:01  -  31 May 2018 07:00  --------------------------------------------------------  IN: 2455 mL / OUT: 1800 mL / NET: 655 mL    31 May 2018 07:01  -  31 May 2018 16:03  --------------------------------------------------------  IN: 0 mL / OUT: 1300 mL / NET: -1300 mL        RADIOLOGY & ADDITIONAL STUDIES:    ADVANCE DIRECTIVES:   DNR YES NO  Completed on:                     LYNNETTE  YES NO   Completed on:  Living Will  YES NO   Completed on: HPI: 67M with PMH as listed admitted 5/28 with severe weakness, debility, found to have severe anemia. ICU on day of admission had goals of care discussion with wife and son, who made the decision for comfort measures only. Per Dr. Houston, he had conversation with wife yesterday who agreed no further blood transfusions or blood draws and comfort measures only. Hospice has been attempting to reach family and have conversations since Tuesday. They have had numerous conversations with no definitive decisions about hospice care.     PERTINENT PMH REVIEWED: Yes     PAST MEDICAL & SURGICAL HISTORY:  Prostate CA  Pulmonary embolism  DVT (deep venous thrombosis)  IDDM (insulin dependent diabetes mellitus)  No significant past surgical history    SOCIAL HISTORY:  no EtOH                                    Admitted from:  home   Surrogate - wife Royal, and son Jw     FAMILY HISTORY:  No pertinent family history in first degree relatives    Baseline ADLs (prior to admission):  Dependent      Allergies    No Known Allergies    Present Symptoms:     Dyspnea: 2- 3   Nausea/Vomiting: No  Anxiety:  yes   Depression: No  Fatigue: Yes   Loss of appetite: No    Pain: none             Character-            Duration-            Effect-            Factors-            Frequency-            Location-            Severity-    Review of Systems: Reviewed                                        Positive: + nausea, + dyspnea   All others negative    MEDICATIONS  (STANDING):    MEDICATIONS  (PRN):  HYDROmorphone  Injectable 0.5 milliGRAM(s) IV Push every 6 hours PRN Moderate Pain (4 - 6)  LORazepam     Tablet 0.5 milliGRAM(s) Oral every 6 hours PRN Nausea and/or Vomiting, or agitation/anxiety  ondansetron Injectable 4 milliGRAM(s) IV Push every 6 hours PRN Nausea    PHYSICAL EXAM:    Vital Signs Last 24 Hrs  T(C): 37.2 (31 May 2018 15:52), Max: 37.4 (31 May 2018 04:57)  T(F): 99 (31 May 2018 15:52), Max: 99.3 (31 May 2018 04:57)  HR: 104 (31 May 2018 15:52) (97 - 108)  BP: 97/61 (31 May 2018 15:52) (91/62 - 97/61)  BP(mean): 72 (31 May 2018 04:57) (72 - 72)  RR: 20 (31 May 2018 15:52) (20 - 24)  SpO2: 100% (31 May 2018 08:00) (100% - 100%)    General: alert; severe cachexia      Karnofsky:  20 %    HEENT: normal      Lungs: dyspnea     CV: normal      GI: normal      : normal      MSK: weakness    Skin: no rash    LABS:                        7.1    x     )-----------( x        ( 30 May 2018 01:33 )             21.1     I&O's Summary    30 May 2018 07:01  -  31 May 2018 07:00  --------------------------------------------------------  IN: 2455 mL / OUT: 1800 mL / NET: 655 mL    31 May 2018 07:01  -  31 May 2018 16:03  --------------------------------------------------------  IN: 0 mL / OUT: 1300 mL / NET: -1300 mL    RADIOLOGY & ADDITIONAL STUDIES: reviewed     ADVANCE DIRECTIVES: DNR/I

## 2018-05-31 NOTE — CONSULT NOTE ADULT - ASSESSMENT
67M with advanced stage 4 metastatic Prostate CA, who is no longer on treatment and had been referred to Hospice a few months ago. He has been in a severe state of decline over the last month with poor eating and drinking. Today he presents with severe weakness and debillitation. He is found to have a Hgb of 3, with a report of black stool by the wife over the last 2 weeks. He received 2 PRBC in the ER. His BP is 100/50 and HR is 100.      I discussed the goals of care with the patient's wife, given the end stage of the patient's disease process, however she deferred decisions to her son Deepak.  An advanced directives/GOC discussion were then completed by Dr Brothers and Deepak. The decision being that the pateint should not receive any further aggressive therapy, should receive comfort care and conservative management. A   DNR/I order was obtained and Hospice will be asked to evaluate the patient as well.      There is no need for admission to the ICU at this time, given the goals of care and advanced directive decisions.
67M with end stage prostate cancer admitted with severe anemia, now with respiratory failure, cachexia, debility, and nausea at end of life on comfort care.

## 2018-05-31 NOTE — CONSULT NOTE ADULT - PROBLEM SELECTOR RECOMMENDATION 4
- patient on comfort care. adjusted symptom management. our team as well as hospice team has attempted to reach wife today numerous times with no response. Hospice has been trying all week to discuss hospice plan of care with her, but she has stated she needed to talk to her kids and that she would get back to them. For now, will concentrate on making sure patients symptoms are well managed.

## 2018-05-31 NOTE — CONSULT NOTE ADULT - PROBLEM SELECTOR RECOMMENDATION 9
Blood loss anemia likely UGI in nature  S/p transfusion  As above   Conservative management
Pt has had no hematemesis in ED here. Questionable hematemsis at home as relayed by daughter. She states that he had a strawberry smoothie prior to vomiting and it is unclear whether he had hematemesis or vomited up the red colored smoothie. Regardless will Rx as if an UGI bleed. IV Pantoprazole continuous infusion. NPO. If pt. has recurrent vomiting would place NGT to LIS. Pt. will likely require MICU admission and may require intubation if respiratory status worsens. He may have aspirated vomitus at home. Transfuse as rapidly as possible and would give IV Vitamin K X 1 to reverse mild Coumadin induced coagulopathy. Pt is not medically optimized for endoscopic intervention unless he has life threatening GI bleeding which is not presently the case. Correction of hyperglycemia as per Medicine. He will likely require EGD when medically optimized possibly tomorrow if medical optimization can be achieved by then. Keep NPO. IVF for BP maintenance. Prognosis is presently guarded.
- end stage, no further antineoplastic treatments.

## 2018-05-31 NOTE — GOALS OF CARE CONVERSATION - PERSONAL ADVANCE DIRECTIVE - CONVERSATION DETAILS
Telephone call made to patient's spouse at the request of Dr Houston. Telephone message left for spouse offering to meet to discuss hospice options and criteria. Hospice contact information left on voicemail. Dr. Houston made aware. Visited patient in his hospital room. Patient was lethargic, unable to participate in a conversation.  made aware of hospice's attempts to meet with family. ANA ROSA Barahona LMSW

## 2018-05-31 NOTE — PROGRESS NOTE ADULT - ASSESSMENT
Pt is a 68 yo Male with a pmh/o afib previously on coumadin and advanced stage 4 metastatic Prostate CA who was referred to hospice but opted for home care in past, who is no longer on treatment, presents to ED with family for rapid decline in PO intake, mentation, and poor quality of life. Pt had questionable episode of hematemesis at home after strawberry shake. IN ED noted ot have hemoglobin <4, hypotensive with altered mental status. Pt was seen by GI and MICU and MICU team had an extensive conversation with family (Wife /Son) as documented in MICU note- they requested DNR /DNI and comfort measures, denied any aggressive intervention hence deemed not MICU candidate and admitted to hospitalist service for acute symptomatic severe anemia likely GI bleeding with hemodynamically instability.  5/30- I spoke to Pt's wife Mrs Collier Royal and discussed the GOC at length including daily labs, feeding tube, blood transfusion- She states Pt should remain on comfort care, no daily labs / blood transfusion and no feeding tube, DNR /I. She further stated she will try to bring family tomorrow to meet with hospice team to decide on hospice inn.     Acute symptomatic severe anemia likely GI bleeding:  - S/p 3 units pRBC transfusion, hgb 7.1 now. Persistently hypotensive- c/w IVFs.  - GI /MICU note reviewed.   - No Aggressive intervention per family, comfort care, avoid daily labs.  - Hospice on board, pending family meeting prior to decide on hospice inn. - Palliative consulted pending eval. .     Metabolic encephelopathy  - Due to severe anemia and metastatic brain cancer.   - On Comfort care,  family to meet hospice team today.     Metastatic prostate cancer:  - Comfort care as above. Pt is a 66 yo Male with a pmh/o afib previously on coumadin and advanced stage 4 metastatic Prostate CA who was referred to hospice but opted for home care in past, who is no longer on treatment, presents to ED with family for rapid decline in PO intake, mentation, and poor quality of life. Pt had questionable episode of hematemesis at home after strawberry shake. In ED noted to have hemoglobin <4, hypotensive with altered mental status. Pt was seen by GI and MICU in ED, MICU team had an extensive conversation with family (Wife /Son) as documented in MICU note- they requested DNR /DNI and comfort measures, denied any aggressive intervention hence deemed not MICU candidate and admitted to hospitalist service for acute symptomatic severe anemia likely GI bleeding with hemodynamically instability. GI signed off given Pt being on comfort care.    5/30- I spoke to Pt's wife Royal Caballero and discussed the GOC at length including daily labs, feeding tube, blood transfusion- She states Pt should remain on comfort care, no daily labs / blood transfusion and no feeding tube, DNR /I. She further stated she will try to bring family to meet with hospice team to decide on hospice inn.     Acute symptomatic severe anemia likely GI bleeding:  - S/p 3 units pRBC transfusion, hgb 7.1 now. BP soft.   - GI /MICU note reviewed.   - No Aggressive intervention per family, comfort care, avoid daily labs / blood transtrusion   - Hospice on board, pending family meeting for hospice inn consent. Hospice team trying to reach wife, did not  today.  - Palliative consulted pending eval.      Metabolic encephelopathy  - Due to severe anemia and metastatic brain cancer.   - On Comfort care.    Metastatic prostate cancer:  - Comfort care as above.

## 2018-06-01 PROCEDURE — 99232 SBSQ HOSP IP/OBS MODERATE 35: CPT

## 2018-06-01 PROCEDURE — G9005: CPT

## 2018-06-01 RX ADMIN — HYDROMORPHONE HYDROCHLORIDE 0.5 MILLIGRAM(S): 2 INJECTION INTRAMUSCULAR; INTRAVENOUS; SUBCUTANEOUS at 05:14

## 2018-06-01 RX ADMIN — HYDROMORPHONE HYDROCHLORIDE 0.5 MILLIGRAM(S): 2 INJECTION INTRAMUSCULAR; INTRAVENOUS; SUBCUTANEOUS at 15:32

## 2018-06-01 RX ADMIN — HYDROMORPHONE HYDROCHLORIDE 0.5 MILLIGRAM(S): 2 INJECTION INTRAMUSCULAR; INTRAVENOUS; SUBCUTANEOUS at 00:05

## 2018-06-01 RX ADMIN — HYDROMORPHONE HYDROCHLORIDE 1 MILLIGRAM(S): 2 INJECTION INTRAMUSCULAR; INTRAVENOUS; SUBCUTANEOUS at 22:43

## 2018-06-01 RX ADMIN — HYDROMORPHONE HYDROCHLORIDE 0.5 MILLIGRAM(S): 2 INJECTION INTRAMUSCULAR; INTRAVENOUS; SUBCUTANEOUS at 17:04

## 2018-06-01 RX ADMIN — ONDANSETRON 4 MILLIGRAM(S): 8 TABLET, FILM COATED ORAL at 12:02

## 2018-06-01 RX ADMIN — HYDROMORPHONE HYDROCHLORIDE 0.5 MILLIGRAM(S): 2 INJECTION INTRAMUSCULAR; INTRAVENOUS; SUBCUTANEOUS at 17:03

## 2018-06-01 RX ADMIN — ONDANSETRON 4 MILLIGRAM(S): 8 TABLET, FILM COATED ORAL at 05:14

## 2018-06-01 RX ADMIN — HYDROMORPHONE HYDROCHLORIDE 1 MILLIGRAM(S): 2 INJECTION INTRAMUSCULAR; INTRAVENOUS; SUBCUTANEOUS at 22:58

## 2018-06-01 RX ADMIN — HYDROMORPHONE HYDROCHLORIDE 0.5 MILLIGRAM(S): 2 INJECTION INTRAMUSCULAR; INTRAVENOUS; SUBCUTANEOUS at 07:56

## 2018-06-01 RX ADMIN — HYDROMORPHONE HYDROCHLORIDE 0.5 MILLIGRAM(S): 2 INJECTION INTRAMUSCULAR; INTRAVENOUS; SUBCUTANEOUS at 12:02

## 2018-06-01 RX ADMIN — HYDROMORPHONE HYDROCHLORIDE 0.5 MILLIGRAM(S): 2 INJECTION INTRAMUSCULAR; INTRAVENOUS; SUBCUTANEOUS at 05:29

## 2018-06-01 RX ADMIN — ONDANSETRON 4 MILLIGRAM(S): 8 TABLET, FILM COATED ORAL at 17:04

## 2018-06-01 NOTE — PROGRESS NOTE ADULT - SUBJECTIVE AND OBJECTIVE BOX
CC: patient appears more comfortable still with respiratory failure     Present Symptoms:     Dyspnea: 0 1 2 3   Nausea/Vomiting: Yes No  Anxiety:  Yes No  Depression: Yes No  Fatigue: Yes No  Loss of appetite: Yes No    Pain:             Character-            Duration-            Effect-            Factors-            Frequency-            Location-            Severity-    Review of Systems: Reviewed                     Negative:                     Positive:  Unable to obtain due to poor mentation   All others negative    MEDICATIONS  (STANDING):  HYDROmorphone  Injectable 0.5 milliGRAM(s) IV Push every 6 hours  ondansetron Injectable 4 milliGRAM(s) IV Push every 6 hours    MEDICATIONS  (PRN):  HYDROmorphone  Injectable 1 milliGRAM(s) IV Push every 2 hours PRN dyspnea or pain  LORazepam     Tablet 0.5 milliGRAM(s) Oral every 2 hours PRN Nausea and/or Vomiting, or agitation/anxiety    PHYSICAL EXAM:    Vital Signs Last 24 Hrs  T(C): 36.5 (01 Jun 2018 08:19), Max: 37.4 (31 May 2018 13:39)  T(F): 97.7 (01 Jun 2018 08:19), Max: 99.3 (31 May 2018 13:39)  HR: 92 (01 Jun 2018 08:19) (92 - 108)  BP: 91/58 (01 Jun 2018 08:19) (91/58 - 97/61)  BP(mean): --  RR: 18 (01 Jun 2018 08:19) (18 - 20)  SpO2: 100% (01 Jun 2018 08:19) (100% - 100%)    General: alert  oriented x ____ lethargic agitated                  cachexia  nonverbal  coma    Karnofsky:  %    HEENT: normal  dry mouth  ET tube/trach    Lungs: comfortable tachypnea/labored breathing  excessive secretions    CV: normal  tachycardia    GI: normal  distended  tender  no BS               PEG/NG/OG tube  constipation  last BM:     : normal  incontinent  oliguria/anuria  mckeon    MSK: normal  weakness  edema             ambulatory  bedbound/wheelchair bound    Skin: normal  pressure ulcers- Stage_____  no rash    LABS:     I&O's Summary    31 May 2018 07:01  -  01 Jun 2018 07:00  --------------------------------------------------------  IN: 0 mL / OUT: 2000 mL / NET: -2000 mL    RADIOLOGY & ADDITIONAL STUDIES:    ADVANCE DIRECTIVES:   DNR YES NO  Completed on:                     LYNNETTE  YES NO   Completed on:  Living Will  YES NO   Completed on: CC: patient appears more comfortable still with respiratory failure     Present Symptoms:     Dyspnea: 0   Nausea/Vomiting: No  Anxiety:  No  Depression: No  Fatigue: Yes   Loss of appetite: No    Pain: none currently             Character-            Duration-            Effect-            Factors-            Frequency-            Location-            Severity-    Review of Systems: Reviewed                     Negative: no chest pain   All others negative    MEDICATIONS  (STANDING):  HYDROmorphone  Injectable 0.5 milliGRAM(s) IV Push every 6 hours  ondansetron Injectable 4 milliGRAM(s) IV Push every 6 hours    MEDICATIONS  (PRN):  HYDROmorphone  Injectable 1 milliGRAM(s) IV Push every 2 hours PRN dyspnea or pain  LORazepam     Tablet 0.5 milliGRAM(s) Oral every 2 hours PRN Nausea and/or Vomiting, or agitation/anxiety    PHYSICAL EXAM:    Vital Signs Last 24 Hrs  T(C): 36.5 (01 Jun 2018 08:19), Max: 37.4 (31 May 2018 13:39)  T(F): 97.7 (01 Jun 2018 08:19), Max: 99.3 (31 May 2018 13:39)  HR: 92 (01 Jun 2018 08:19) (92 - 108)  BP: 91/58 (01 Jun 2018 08:19) (91/58 - 97/61)  BP(mean): --  RR: 18 (01 Jun 2018 08:19) (18 - 20)  SpO2: 100% (01 Jun 2018 08:19) (100% - 100%)    General: alert ; cachexia     Karnofsky:  20 %    HEENT: normal      Lungs: comfortable    CV: normal      GI: normal      : normal    MSK: weakness    Skin: no rash    LABS:     I&O's Summary    31 May 2018 07:01  -  01 Jun 2018 07:00  --------------------------------------------------------  IN: 0 mL / OUT: 2000 mL / NET: -2000 mL    RADIOLOGY & ADDITIONAL STUDIES:    ADVANCE DIRECTIVES: DNR/I

## 2018-06-01 NOTE — GOALS OF CARE CONVERSATION - PERSONAL ADVANCE DIRECTIVE - CONVERSATION DETAILS
Multiple attempts made by Hospice Liaison to set up meeting for review of services with patients spouse.  Telephone call placed today and upon discussion with spouse, she asked that I call her son Jw to discuss services.  Message left on Zia cell phone to call our main [facility] office at [phone] to discuss referral.  He can call over the weekend as well to discuss services/arrange meeting.  In house Liaison will return Monday and will follow up at that point to discuss hospice services if no return call received prior to end of day Friday or over weekend.

## 2018-06-01 NOTE — PROGRESS NOTE ADULT - SUBJECTIVE AND OBJECTIVE BOX
ANGELIC DELGADO Male 67y [id number]    Patient is a 67y old  Male who presents with a chief complaint of vomiting blood, weakness (28 May 2018 21:38)      Subjective /Objective:  Pt seen and examined at bedside, no over night event reported by night staff. Pt little more awake today, he is alert, does follow commands, states I feel little better today.  Hospice /palliative on board to arrange meeting with wife- multiple attempts made by hospice team and also by palliative team yesterday but she was unreachable. Spoke with hospice team team, they will follow up with wife today and I will also call her.          Review of system:  Limited but no fever, chills, chest pain, SOB.    PHYSICAL EXAM:    Vital Signs Last 24 Hrs  T(C): 36.5 (01 Jun 2018 08:19), Max: 37.4 (31 May 2018 13:39)  T(F): 97.7 (01 Jun 2018 08:19), Max: 99.3 (31 May 2018 13:39)  HR: 92 (01 Jun 2018 08:19) (92 - 108)  BP: 91/58 (01 Jun 2018 08:19) (91/58 - 97/61)  BP(mean): --  RR: 18 (01 Jun 2018 08:19) (18 - 20)  SpO2: 100% (01 Jun 2018 08:19) (100% - 100%)      GENERAL: Pt ill looking, cachexia, NAD.   CHEST/LUNG: Clear to auscultation bilaterally; No wheezing.  HEART: S1S2+, Regular rate and rhythm; No murmurs.  ABDOMEN: Soft, Nontender, Nondistended; Bowel sounds present.  Extremities: No LE edema.  NEURO: AAOX2, follows simple commands.     MEDICATIONS  (STANDING):  HYDROmorphone  Injectable 0.5 milliGRAM(s) IV Push every 6 hours  ondansetron Injectable 4 milliGRAM(s) IV Push every 6 hours    MEDICATIONS  (PRN):  HYDROmorphone  Injectable 1 milliGRAM(s) IV Push every 2 hours PRN dyspnea or pain  LORazepam     Tablet 0.5 milliGRAM(s) Oral every 2 hours PRN Nausea and/or Vomiting, or agitation/anxiety        Labs:  LABS:

## 2018-06-01 NOTE — PROGRESS NOTE ADULT - ASSESSMENT
Pt is a 68 yo Male with a pmh/o afib previously on coumadin and advanced stage 4 metastatic Prostate CA who was referred to hospice but opted for home care in past, who is no longer on treatment, presents to ED with family for rapid decline in PO intake, mentation, and poor quality of life. Pt had questionable episode of hematemesis at home after strawberry shake. In ED noted to have hemoglobin <4, hypotensive with altered mental status. Pt was seen by GI and MICU in ED, MICU team had an extensive conversation with family (Wife /Son) as documented in MICU note- they requested DNR /DNI and comfort measures, denied any aggressive intervention hence deemed not MICU candidate and admitted to hospitalist service for acute symptomatic severe anemia likely GI bleeding with hemodynamically instability. GI signed off given Pt being on comfort care. Palliative /hospice on board and Pt is on comfort care.     5/30- I spoke to Pt's wife Mrs Collier, Royal and discussed the GOC at length including daily labs, feeding tube, blood transfusion- She states Pt should remain on comfort care, no daily labs / blood transfusion and no feeding tube, DNR /I. She further stated she will try to bring family to meet with hospice team to decide on hospice inn.     Acute symptomatic severe anemia likely GI bleeding:  - S/p 3 units pRBC transfusion on admission.    - GI /MICU note reviewed. MICU team also had extensive discussion with wife /son on admission and family opted for comfort care.  - No Aggressive intervention per family, comfort care, avoid daily labs / blood transtrusion   - Hospice on board, pending family meeting for hospice inn consent. Hospice team trying to reach wife.  - Palliative consult appreciated.   - C/w comfort measures.      Metabolic encephelopathy  - Due to severe anemia and metastatic brain cancer.   - On Comfort care.    Metastatic prostate cancer:  - Comfort care as above.

## 2018-06-01 NOTE — PROGRESS NOTE ADULT - ASSESSMENT
67M with end stage prostate cancer admitted with severe anemia, now with respiratory failure, cachexia, debility, and nausea at end of life on comfort care.

## 2018-06-02 LAB
CULTURE RESULTS: SIGNIFICANT CHANGE UP
CULTURE RESULTS: SIGNIFICANT CHANGE UP
SPECIMEN SOURCE: SIGNIFICANT CHANGE UP
SPECIMEN SOURCE: SIGNIFICANT CHANGE UP

## 2018-06-02 PROCEDURE — 99232 SBSQ HOSP IP/OBS MODERATE 35: CPT

## 2018-06-02 RX ADMIN — HYDROMORPHONE HYDROCHLORIDE 0.5 MILLIGRAM(S): 2 INJECTION INTRAMUSCULAR; INTRAVENOUS; SUBCUTANEOUS at 11:29

## 2018-06-02 RX ADMIN — HYDROMORPHONE HYDROCHLORIDE 0.5 MILLIGRAM(S): 2 INJECTION INTRAMUSCULAR; INTRAVENOUS; SUBCUTANEOUS at 06:20

## 2018-06-02 RX ADMIN — ONDANSETRON 4 MILLIGRAM(S): 8 TABLET, FILM COATED ORAL at 00:52

## 2018-06-02 RX ADMIN — HYDROMORPHONE HYDROCHLORIDE 0.5 MILLIGRAM(S): 2 INJECTION INTRAMUSCULAR; INTRAVENOUS; SUBCUTANEOUS at 01:06

## 2018-06-02 RX ADMIN — ONDANSETRON 4 MILLIGRAM(S): 8 TABLET, FILM COATED ORAL at 06:21

## 2018-06-02 RX ADMIN — HYDROMORPHONE HYDROCHLORIDE 0.5 MILLIGRAM(S): 2 INJECTION INTRAMUSCULAR; INTRAVENOUS; SUBCUTANEOUS at 00:51

## 2018-06-02 RX ADMIN — HYDROMORPHONE HYDROCHLORIDE 0.5 MILLIGRAM(S): 2 INJECTION INTRAMUSCULAR; INTRAVENOUS; SUBCUTANEOUS at 06:35

## 2018-06-02 RX ADMIN — ONDANSETRON 4 MILLIGRAM(S): 8 TABLET, FILM COATED ORAL at 11:29

## 2018-06-02 RX ADMIN — ONDANSETRON 4 MILLIGRAM(S): 8 TABLET, FILM COATED ORAL at 17:27

## 2018-06-02 RX ADMIN — HYDROMORPHONE HYDROCHLORIDE 0.5 MILLIGRAM(S): 2 INJECTION INTRAMUSCULAR; INTRAVENOUS; SUBCUTANEOUS at 17:30

## 2018-06-02 RX ADMIN — HYDROMORPHONE HYDROCHLORIDE 0.5 MILLIGRAM(S): 2 INJECTION INTRAMUSCULAR; INTRAVENOUS; SUBCUTANEOUS at 18:14

## 2018-06-02 RX ADMIN — HYDROMORPHONE HYDROCHLORIDE 0.5 MILLIGRAM(S): 2 INJECTION INTRAMUSCULAR; INTRAVENOUS; SUBCUTANEOUS at 11:32

## 2018-06-02 NOTE — PROGRESS NOTE ADULT - SUBJECTIVE AND OBJECTIVE BOX
ANGELIC DELGADO Male 67y [id number]    Patient is a 67y old  Male who presents with a chief complaint of vomiting blood, weakness (28 May 2018 21:38)      Subjective/objective:  Pt seen and examined at bedside, no over night event reported by night staff. Pt is awake /alert and does follow simple commands, states I am ok, denied any pain, breathing more comfortable today. Family fired at bedside.     Review of system:  Limited but no fever, chills, chest pain, SOB.      PHYSICAL EXAM:    Vital Signs Last 24 Hrs  T(C): 36.9 (02 Jun 2018 08:28), Max: 36.9 (02 Jun 2018 08:28)  T(F): 98.4 (02 Jun 2018 08:28), Max: 98.4 (02 Jun 2018 08:28)  HR: 101 (02 Jun 2018 08:28) (101 - 104)  BP: 105/69 (02 Jun 2018 08:28) (93/61 - 105/69)  BP(mean): --  RR: 18 (02 Jun 2018 08:28) (18 - 18)  SpO2: 97% (01 Jun 2018 17:18) (97% - 97%)    GENERAL: Pt ill looking, cachexia, NAD.   CHEST/LUNG: Clear to auscultation bilaterally; No wheezing.  HEART: S1S2+, Regular rate and rhythm; No murmurs.  ABDOMEN: Soft, Nontender, Nondistended; Bowel sounds present.  Extremities: No LE edema.  NEURO: AAOX2, follows simple commands.         MEDICATIONS  (STANDING):  HYDROmorphone  Injectable 0.5 milliGRAM(s) IV Push every 6 hours  ondansetron Injectable 4 milliGRAM(s) IV Push every 6 hours    MEDICATIONS  (PRN):  HYDROmorphone  Injectable 1 milliGRAM(s) IV Push every 2 hours PRN dyspnea or pain  LORazepam     Tablet 0.5 milliGRAM(s) Oral every 2 hours PRN Nausea and/or Vomiting, or agitation/anxiety        Labs:  LABS:

## 2018-06-02 NOTE — PROGRESS NOTE ADULT - ASSESSMENT
Pt is a 68 yo Male with a pmh/o afib previously on coumadin and advanced stage 4 metastatic Prostate CA who was referred to hospice but opted for home care in past, who is no longer on treatment, presents to ED with family for rapid decline in PO intake, mentation, and poor quality of life. Pt had questionable episode of hematemesis at home after strawberry shake. In ED noted to have hemoglobin <4, hypotensive with altered mental status. Pt was seen by GI and MICU in ED, MICU team had an extensive conversation with family (Wife /Son) as documented in MICU note- they requested DNR /DNI and comfort measures, denied any aggressive intervention hence deemed not MICU candidate and admitted to hospitalist service for acute symptomatic severe anemia likely GI bleeding with hemodynamically instability. GI signed off given Pt being on comfort care. Palliative /hospice on board and Pt is on comfort care.     I have spoke to Pt's wife Royal Caballero multiple times over the phone and discussed the GOC at length including daily labs, feeding tube, blood transfusion- She states Pt should remain on comfort care, no daily labs / blood transfusion and no feeding tube, DNR /I. She further stated she will try to bring family to meet with hospice team to decide on hospice inn but has not been here yet. Hospice /Palliative team called her multiple times initially unreachable and then said talk to my son to discuss hospice service. Hospice did call to his son Mr Escalera  ([phone]) but did not .     Acute symptomatic severe anemia likely GI bleeding:  - S/p 3 units pRBC transfusion on admission.    - GI /MICU note reviewed. MICU team also had extensive discussion with wife /son on admission and family opted for comfort care.  - No Aggressive intervention per family, comfort care, avoid daily labs / blood transtrusion   - Palliative / Hospice on board, pending family meeting for hospice inn consent.  - C/w comfort measures.      Metabolic encephelopathy  - Due to severe anemia and metastatic brain cancer.   - On Comfort care.    Metastatic prostate cancer:  - Comfort care as above.

## 2018-06-03 PROCEDURE — 99232 SBSQ HOSP IP/OBS MODERATE 35: CPT

## 2018-06-03 RX ADMIN — HYDROMORPHONE HYDROCHLORIDE 0.5 MILLIGRAM(S): 2 INJECTION INTRAMUSCULAR; INTRAVENOUS; SUBCUTANEOUS at 05:33

## 2018-06-03 RX ADMIN — HYDROMORPHONE HYDROCHLORIDE 0.5 MILLIGRAM(S): 2 INJECTION INTRAMUSCULAR; INTRAVENOUS; SUBCUTANEOUS at 17:48

## 2018-06-03 RX ADMIN — HYDROMORPHONE HYDROCHLORIDE 0.5 MILLIGRAM(S): 2 INJECTION INTRAMUSCULAR; INTRAVENOUS; SUBCUTANEOUS at 17:44

## 2018-06-03 RX ADMIN — HYDROMORPHONE HYDROCHLORIDE 0.5 MILLIGRAM(S): 2 INJECTION INTRAMUSCULAR; INTRAVENOUS; SUBCUTANEOUS at 05:51

## 2018-06-03 RX ADMIN — ONDANSETRON 4 MILLIGRAM(S): 8 TABLET, FILM COATED ORAL at 17:44

## 2018-06-03 RX ADMIN — ONDANSETRON 4 MILLIGRAM(S): 8 TABLET, FILM COATED ORAL at 05:33

## 2018-06-03 RX ADMIN — HYDROMORPHONE HYDROCHLORIDE 0.5 MILLIGRAM(S): 2 INJECTION INTRAMUSCULAR; INTRAVENOUS; SUBCUTANEOUS at 00:40

## 2018-06-03 RX ADMIN — HYDROMORPHONE HYDROCHLORIDE 0.5 MILLIGRAM(S): 2 INJECTION INTRAMUSCULAR; INTRAVENOUS; SUBCUTANEOUS at 12:37

## 2018-06-03 RX ADMIN — ONDANSETRON 4 MILLIGRAM(S): 8 TABLET, FILM COATED ORAL at 00:20

## 2018-06-03 RX ADMIN — ONDANSETRON 4 MILLIGRAM(S): 8 TABLET, FILM COATED ORAL at 11:07

## 2018-06-03 RX ADMIN — HYDROMORPHONE HYDROCHLORIDE 0.5 MILLIGRAM(S): 2 INJECTION INTRAMUSCULAR; INTRAVENOUS; SUBCUTANEOUS at 00:20

## 2018-06-03 RX ADMIN — HYDROMORPHONE HYDROCHLORIDE 0.5 MILLIGRAM(S): 2 INJECTION INTRAMUSCULAR; INTRAVENOUS; SUBCUTANEOUS at 11:06

## 2018-06-03 NOTE — PROGRESS NOTE ADULT - SUBJECTIVE AND OBJECTIVE BOX
ANGELIC DELGADO Male 67y [id number]    Patient is a 67y old  Male who presents with a chief complaint of vomiting blood, weakness (28 May 2018 21:38)      Off service note:  Pt seen and examined at bedside, here with severer anemia and metastatic cancer with encephalopathy, no over night event reported by night staff. Pt is more awake /alert and does follow simple commands, states I am ok, denied any pain, breathing more comfortable today. Pt on comfort measure, pending family consent for hospice inn. Hospice / Palliative team on board, multiple attempts made to set up family meeting with wife and son but wife not able to make it so far. Please follow up with hospice / palliative team in morning.       Review of system:  Limited but no fever, chills, chest pain, SOB.      PHYSICAL EXAM:    Vital Signs Last 24 Hrs  T(C): 36.9 (03 Jun 2018 08:42), Max: 36.9 (03 Jun 2018 08:42)  T(F): 98.4 (03 Jun 2018 08:42), Max: 98.4 (03 Jun 2018 08:42)  HR: 93 (03 Jun 2018 08:42) (93 - 100)  BP: 100/59 (03 Jun 2018 08:42) (90/41 - 100/59)  BP(mean): --  RR: 19 (03 Jun 2018 08:42) (18 - 19)  SpO2: 100% (02 Jun 2018 23:53) (100% - 100%)    GENERAL: Pt ill looking, cachexia, NAD.   CHEST/LUNG: Clear to auscultation bilaterally; No wheezing.  HEART: S1S2+, Regular rate and rhythm; No murmurs.  ABDOMEN: Soft, Nontender, Nondistended; Bowel sounds present.  Extremities: No LE edema.  NEURO: AAOX2, follows simple commands.             MEDICATIONS  (STANDING):  HYDROmorphone  Injectable 0.5 milliGRAM(s) IV Push every 6 hours  ondansetron Injectable 4 milliGRAM(s) IV Push every 6 hours    MEDICATIONS  (PRN):  HYDROmorphone  Injectable 1 milliGRAM(s) IV Push every 2 hours PRN dyspnea or pain  LORazepam     Tablet 0.5 milliGRAM(s) Oral every 2 hours PRN Nausea and/or Vomiting, or agitation/anxiety        Labs:  LABS:

## 2018-06-03 NOTE — PROGRESS NOTE ADULT - ASSESSMENT
Pt is a 66 yo Male with a pmh/o afib previously on coumadin and advanced stage 4 metastatic Prostate CA who was referred to hospice but opted for home care in past, who is no longer on treatment, presents to ED with family for rapid decline in PO intake, mentation, and poor quality of life. Pt had questionable episode of hematemesis at home after strawberry shake. In ED noted to have hemoglobin <4, hypotensive with altered mental status. Pt was seen by GI and MICU in ED, MICU team had an extensive conversation with family (Wife /Son) as documented in MICU note- they requested DNR /DNI and comfort measures, denied any aggressive intervention hence deemed not MICU candidate and admitted to hospitalist service for acute symptomatic severe anemia likely GI bleeding with hemodynamically instability. GI signed off given Pt being on comfort care. Palliative /hospice on board and Pt is on comfort care.     I have spoke to Pt's wife Royal Caballero multiple times over the phone and discussed the GOC at length including daily labs, feeding tube, blood transfusion- She states Pt should remain on comfort care, no daily labs / blood transfusion and no feeding tube, DNR /I. She further stated she will try to bring family to meet with hospice team to decide on hospice inn but has not been here yet. Hospice /Palliative team called her multiple times initially unreachable and then said talk to my son to discuss hospice service. Hospice and I did call to his son Mr Escalera  ([phone]) but did not , voice mail left.     Acute symptomatic severe anemia likely GI bleeding:  - S/p 3 units pRBC transfusion on admission.    - GI /MICU note reviewed. MICU team also had extensive discussion with wife /son on admission and family opted for comfort care.  - No Aggressive intervention per family, comfort care, avoid daily labs / blood transtrusion   - Palliative / Hospice on board, pending family meeting for hospice inn consent.  - C/w comfort measures.      Metabolic encephelopathy  - Due to severe anemia and metastatic brain cancer.   - On Comfort care.    Metastatic prostate cancer:  - Comfort care as above.     Disposition- Please recontact family tomorrow to arrange family meeting with hospice / palliative team for disposition.

## 2018-06-04 PROCEDURE — 71045 X-RAY EXAM CHEST 1 VIEW: CPT | Mod: 26

## 2018-06-04 PROCEDURE — 99233 SBSQ HOSP IP/OBS HIGH 50: CPT

## 2018-06-04 RX ADMIN — HYDROMORPHONE HYDROCHLORIDE 0.5 MILLIGRAM(S): 2 INJECTION INTRAMUSCULAR; INTRAVENOUS; SUBCUTANEOUS at 23:18

## 2018-06-04 RX ADMIN — HYDROMORPHONE HYDROCHLORIDE 0.5 MILLIGRAM(S): 2 INJECTION INTRAMUSCULAR; INTRAVENOUS; SUBCUTANEOUS at 19:21

## 2018-06-04 RX ADMIN — ONDANSETRON 4 MILLIGRAM(S): 8 TABLET, FILM COATED ORAL at 19:19

## 2018-06-04 RX ADMIN — HYDROMORPHONE HYDROCHLORIDE 0.5 MILLIGRAM(S): 2 INJECTION INTRAMUSCULAR; INTRAVENOUS; SUBCUTANEOUS at 05:34

## 2018-06-04 RX ADMIN — ONDANSETRON 4 MILLIGRAM(S): 8 TABLET, FILM COATED ORAL at 23:18

## 2018-06-04 RX ADMIN — HYDROMORPHONE HYDROCHLORIDE 0.5 MILLIGRAM(S): 2 INJECTION INTRAMUSCULAR; INTRAVENOUS; SUBCUTANEOUS at 13:32

## 2018-06-04 RX ADMIN — HYDROMORPHONE HYDROCHLORIDE 0.5 MILLIGRAM(S): 2 INJECTION INTRAMUSCULAR; INTRAVENOUS; SUBCUTANEOUS at 00:07

## 2018-06-04 RX ADMIN — HYDROMORPHONE HYDROCHLORIDE 0.5 MILLIGRAM(S): 2 INJECTION INTRAMUSCULAR; INTRAVENOUS; SUBCUTANEOUS at 05:49

## 2018-06-04 RX ADMIN — ONDANSETRON 4 MILLIGRAM(S): 8 TABLET, FILM COATED ORAL at 13:32

## 2018-06-04 RX ADMIN — ONDANSETRON 4 MILLIGRAM(S): 8 TABLET, FILM COATED ORAL at 05:34

## 2018-06-04 RX ADMIN — HYDROMORPHONE HYDROCHLORIDE 0.5 MILLIGRAM(S): 2 INJECTION INTRAMUSCULAR; INTRAVENOUS; SUBCUTANEOUS at 00:22

## 2018-06-04 RX ADMIN — HYDROMORPHONE HYDROCHLORIDE 0.5 MILLIGRAM(S): 2 INJECTION INTRAMUSCULAR; INTRAVENOUS; SUBCUTANEOUS at 14:32

## 2018-06-04 RX ADMIN — ONDANSETRON 4 MILLIGRAM(S): 8 TABLET, FILM COATED ORAL at 00:07

## 2018-06-04 NOTE — PROGRESS NOTE ADULT - ASSESSMENT
Pt is a 68 yo Male with a pmh/o afib previously on coumadin and advanced stage 4 metastatic Prostate CA who was referred to hospice but opted for home care in past, who is no longer on treatment, presents to ED with family for rapid decline in PO intake, mentation, and poor quality of life. Pt had questionable episode of hematemesis at home after strawberry shake. In ED noted to have hemoglobin <4, hypotensive with altered mental status. Pt was seen by GI and MICU in ED, MICU team had an extensive conversation with family (Wife /Son) as documented in MICU note- they requested DNR /DNI and comfort measures, denied any aggressive intervention hence deemed not MICU candidate and admitted to hospitalist service for acute symptomatic severe anemia likely GI bleeding with hemodynamically instability. GI signed off given Pt being on comfort care. Palliative /hospice on board and Pt is on comfort care.     I have spoke to Pt's wife Royal Caballero multiple times over the phone and discussed the GOC at length including daily labs, feeding tube, blood transfusion- She states Pt should remain on comfort care, no daily labs / blood transfusion and no feeding tube, DNR /I. She further stated she will try to bring family to meet with hospice team to decide on hospice inn but has not been here yet. Hospice /Palliative team called her multiple times initially unreachable and then said talk to my son to discuss hospice service. Hospice and I did call to his son Mr Escalera  ([phone]) but did not , voice mail left.     Acute symptomatic severe anemia likely GI bleeding:  - S/p 3 units pRBC transfusion on admission.    - GI /MICU note reviewed. MICU team also had extensive discussion with wife /son on admission and family opted for comfort care.  - No Aggressive intervention per family, comfort care, avoid daily labs / blood transtrusion   - Palliative / Hospice on board, pending family meeting for hospice inn consent.  - C/w comfort measures.      Metabolic encephelopathy  - Due to severe anemia and metastatic brain cancer.   - On Comfort care.    Metastatic prostate cancer:  - Comfort care as above.     c/o productive cough & pleuritic right sided chest pain- CXR stat, Pox 98% on RA, afebrile, will monitor    Disposition- Will recontact family today to arrange family meeting with hospice / palliative team for disposition.

## 2018-06-04 NOTE — PROGRESS NOTE ADULT - SUBJECTIVE AND OBJECTIVE BOX
CHIEF COMPLAINT/INTERVAL HISTORY:    Patient is a 67y old  Male who presents with a chief complaint of vomiting blood, weakness (28 May 2018 21:38)      HPI:  Pt is a 66 yo Male with a pmh/o afib previously on coumadin and advanced stage 4 metastatic Prostate CA who was referred to hospice but opted for home care, who is no longer on treatment who presents to ED with family for rapid decline in PO intake, mentation, and quality of life. Family at bedside to provide history. Family states he is recently, constantly in pain, moaning and less and less alert. Pt was evaluated by ICU and exstensive conversation had where DNR/DNI and comfort goals established. Pt was transfused with appropriate response in ED after HgB 3. Pt had questionable episode of hematemesis at home after strawberry shake, no nausea or vomiting noted in ED since presentation, no diarrhea, no cp, no palpitations. (28 May 2018 21:38)      SUBJECTIVE & OBJECTIVE/ ROS: Pt seen and examined at bedside. c/o productive cough & pleuritic right sided chest pain. VOices no other complaints at this time.     ICU Vital Signs Last 24 Hrs  T(C): 36.3 (04 Jun 2018 07:49), Max: 37 (03 Jun 2018 23:35)  T(F): 97.4 (04 Jun 2018 07:49), Max: 98.6 (03 Jun 2018 23:35)  HR: 104 (04 Jun 2018 07:49) (103 - 110)  BP: 98/61 (04 Jun 2018 07:49) (98/61 - 106/65)  BP(mean): --  ABP: --  ABP(mean): --  RR: 18 (04 Jun 2018 07:49) (18 - 19)  SpO2: 98% (04 Jun 2018 07:49) (98% - 99%)        MEDICATIONS  (STANDING):  HYDROmorphone  Injectable 0.5 milliGRAM(s) IV Push every 6 hours  ondansetron Injectable 4 milliGRAM(s) IV Push every 6 hours    MEDICATIONS  (PRN):  HYDROmorphone  Injectable 1 milliGRAM(s) IV Push every 2 hours PRN dyspnea or pain  LORazepam     Tablet 0.5 milliGRAM(s) Oral every 2 hours PRN Nausea and/or Vomiting, or agitation/anxiety      LABS:                CAPILLARY BLOOD GLUCOSE          RECENT CULTURES:      RADIOLOGY & ADDITIONAL TESTS:      PHYSICAL EXAM:  GENERAL: Pt ill looking, cachexia, NAD.   CHEST/LUNG: Clear to auscultation bilaterally; No wheezing.  HEART: S1S2+, Regular rate and rhythm; No murmurs.  ABDOMEN: Soft, Nontender, Nondistended; Bowel sounds present.  Extremities: No LE edema.  NEURO: AAOX2, appears drowsy but follows simple commands.

## 2018-06-05 DIAGNOSIS — R53.81 OTHER MALAISE: ICD-10-CM

## 2018-06-05 LAB — GLUCOSE BLDC GLUCOMTR-MCNC: 161 MG/DL — HIGH (ref 70–99)

## 2018-06-05 PROCEDURE — 99233 SBSQ HOSP IP/OBS HIGH 50: CPT

## 2018-06-05 RX ORDER — HYDROMORPHONE HYDROCHLORIDE 2 MG/ML
1 INJECTION INTRAMUSCULAR; INTRAVENOUS; SUBCUTANEOUS EVERY 4 HOURS
Qty: 0 | Refills: 0 | Status: DISCONTINUED | OUTPATIENT
Start: 2018-06-05 | End: 2018-06-10

## 2018-06-05 RX ORDER — SODIUM CHLORIDE 9 MG/ML
1000 INJECTION INTRAMUSCULAR; INTRAVENOUS; SUBCUTANEOUS
Qty: 0 | Refills: 0 | Status: DISCONTINUED | OUTPATIENT
Start: 2018-06-05 | End: 2018-06-10

## 2018-06-05 RX ADMIN — HYDROMORPHONE HYDROCHLORIDE 1 MILLIGRAM(S): 2 INJECTION INTRAMUSCULAR; INTRAVENOUS; SUBCUTANEOUS at 17:32

## 2018-06-05 RX ADMIN — HYDROMORPHONE HYDROCHLORIDE 0.5 MILLIGRAM(S): 2 INJECTION INTRAMUSCULAR; INTRAVENOUS; SUBCUTANEOUS at 05:10

## 2018-06-05 RX ADMIN — SODIUM CHLORIDE 75 MILLILITER(S): 9 INJECTION INTRAMUSCULAR; INTRAVENOUS; SUBCUTANEOUS at 10:30

## 2018-06-05 RX ADMIN — HYDROMORPHONE HYDROCHLORIDE 1 MILLIGRAM(S): 2 INJECTION INTRAMUSCULAR; INTRAVENOUS; SUBCUTANEOUS at 14:20

## 2018-06-05 RX ADMIN — SODIUM CHLORIDE 75 MILLILITER(S): 9 INJECTION INTRAMUSCULAR; INTRAVENOUS; SUBCUTANEOUS at 23:10

## 2018-06-05 RX ADMIN — HYDROMORPHONE HYDROCHLORIDE 0.5 MILLIGRAM(S): 2 INJECTION INTRAMUSCULAR; INTRAVENOUS; SUBCUTANEOUS at 04:54

## 2018-06-05 RX ADMIN — ONDANSETRON 4 MILLIGRAM(S): 8 TABLET, FILM COATED ORAL at 04:54

## 2018-06-05 RX ADMIN — HYDROMORPHONE HYDROCHLORIDE 1 MILLIGRAM(S): 2 INJECTION INTRAMUSCULAR; INTRAVENOUS; SUBCUTANEOUS at 23:10

## 2018-06-05 RX ADMIN — ONDANSETRON 4 MILLIGRAM(S): 8 TABLET, FILM COATED ORAL at 23:11

## 2018-06-05 RX ADMIN — HYDROMORPHONE HYDROCHLORIDE 1 MILLIGRAM(S): 2 INJECTION INTRAMUSCULAR; INTRAVENOUS; SUBCUTANEOUS at 23:41

## 2018-06-05 RX ADMIN — HYDROMORPHONE HYDROCHLORIDE 1 MILLIGRAM(S): 2 INJECTION INTRAMUSCULAR; INTRAVENOUS; SUBCUTANEOUS at 17:16

## 2018-06-05 RX ADMIN — ONDANSETRON 4 MILLIGRAM(S): 8 TABLET, FILM COATED ORAL at 12:01

## 2018-06-05 RX ADMIN — ONDANSETRON 4 MILLIGRAM(S): 8 TABLET, FILM COATED ORAL at 17:15

## 2018-06-05 RX ADMIN — HYDROMORPHONE HYDROCHLORIDE 1 MILLIGRAM(S): 2 INJECTION INTRAMUSCULAR; INTRAVENOUS; SUBCUTANEOUS at 13:56

## 2018-06-05 NOTE — PROGRESS NOTE ADULT - SUBJECTIVE AND OBJECTIVE BOX
CHIEF COMPLAINT/INTERVAL HISTORY:    Patient is a 67y old  Male who presents with a chief complaint of vomiting blood, weakness (28 May 2018 21:38)      HPI:  Pt is a 68 yo Male with a pmh/o afib previously on coumadin and advanced stage 4 metastatic Prostate CA who was referred to hospice but opted for home care, who is no longer on treatment who presents to ED with family for rapid decline in PO intake, mentation, and quality of life. Family at bedside to provide history. Family states he is recently, constantly in pain, moaning and less and less alert. Pt was evaluated by ICU and exstensive conversation had where DNR/DNI and comfort goals established. Pt was transfused with appropriate response in ED after HgB 3. Pt had questionable episode of hematemesis at home after strawberry shake, no nausea or vomiting noted in ED since presentation, no diarrhea, no cp, no palpitations. (28 May 2018 21:38)      SUBJECTIVE & OBJECTIVE/ ROS: Pt seen and examined at bedside. Pt c/o bed beng uncomfortable. No chest pain, palpitations, sob, light headedness/dizziness, difficulty breathing/cough. Voces no other complaints. Son Deepak at bedside.     ICU Vital Signs Last 24 Hrs  T(C): 36.6 (04 Jun 2018 15:47), Max: 36.6 (04 Jun 2018 15:47)  T(F): 97.8 (04 Jun 2018 15:47), Max: 97.8 (04 Jun 2018 15:47)  HR: --  BP: 97/73 (04 Jun 2018 15:47) (97/73 - 97/73)  BP(mean): --  ABP: --  ABP(mean): --  RR: 18 (04 Jun 2018 15:47) (18 - 18)  SpO2: 98% (04 Jun 2018 15:47) (98% - 98%)        MEDICATIONS  (STANDING):  HYDROmorphone  Injectable 0.5 milliGRAM(s) IV Push every 6 hours  ondansetron Injectable 4 milliGRAM(s) IV Push every 6 hours    MEDICATIONS  (PRN):  HYDROmorphone  Injectable 1 milliGRAM(s) IV Push every 2 hours PRN dyspnea or pain  LORazepam     Tablet 0.5 milliGRAM(s) Oral every 2 hours PRN Nausea and/or Vomiting, or agitation/anxiety      LABS:                CAPILLARY BLOOD GLUCOSE          RECENT CULTURES:      RADIOLOGY & ADDITIONAL TESTS:      PHYSICAL EXAM:    PHYSICAL EXAM:  GENERAL: Pt ill looking, cachexia, NAD.   CHEST/LUNG: Clear to auscultation bilaterally; No wheezing.  HEART: S1S2+, Regular rate and rhythm; No murmurs.  ABDOMEN: Soft, Nontender, Nondistended; Bowel sounds present.  Extremities: No LE edema.  NEURO: AAOX2, follows simple commands.   +Sagastume with dark urine

## 2018-06-05 NOTE — GOALS OF CARE CONVERSATION - PERSONAL ADVANCE DIRECTIVE - CONVERSATION DETAILS
hospice program hospice program and services explained  son had to go to work but he will visit the [facility] and decided if he wishes to pursue inpt hospice at the [facility] clinically pt appears appropriate for inpt hospice . Son understands the short term nature of inpt hospice for symptom management . hospice will remain available . continue to follow

## 2018-06-05 NOTE — PROGRESS NOTE ADULT - ASSESSMENT
Pt is a 66 yo Male with a pmh/o afib previously on coumadin and advanced stage 4 metastatic Prostate CA who was referred to hospice but opted for home care in past, who is no longer on treatment, presents to ED with family for rapid decline in PO intake, mentation, and poor quality of life. Pt had questionable episode of hematemesis at home after strawberry shake. In ED noted to have hemoglobin <4, hypotensive with altered mental status. Pt was seen by GI and MICU in ED, MICU team had an extensive conversation with family (Wife /Son) as documented in MICU note- they requested DNR /DNI and comfort measures, denied any aggressive intervention hence deemed not MICU candidate and admitted to hospitalist service for acute symptomatic severe anemia likely GI bleeding with hemodynamically instability. GI signed off given Pt being on comfort care. Palliative /hospice on board and Pt is on comfort care.     Prior hospitalist spoke to Pt's wife Royal Caballero multiple times over the phone and discussed the GOC at length including daily labs, feeding tube, blood transfusion- She states Pt should remain on comfort care, no daily labs / blood transfusion and no feeding tube, DNR /I. She further stated she will try to bring family to meet with hospice team to decide on hospice inn but has not been here yet. Hospice /Palliative team called her multiple times initially unreachable and then said talk to my son to discuss hospice service. Hospice and prior hospitalist did call to his son Mr Escalera  ([phone]) but did not , voice mail left. I tried calling wife Royal [phone] yesterday but did not answer, left VM.     Acute symptomatic severe anemia likely GI bleeding:  - S/p 3 units pRBC transfusion on admission.    - GI /MICU note reviewed. MICU team also had extensive discussion with wife /son on admission and family opted for comfort care.  - No Aggressive intervention per family, comfort care, avoid daily labs / blood transtrusion   - Palliative / Hospice on board, pending family meeting for hospice inn consent.  - C/w comfort measures. Pain meds & ativan prn   I tried calling wife Royal [phone] yesterday but did not answer, left VM.   Son Deepak at bedside today. He expressed that he wanted his father to be here inhouse. Disccussed with him about GOC; the higher level of comfort & end of life care pt would receive at the hospice inn. Hospice notifed to talk to the son while he is in house.   IVF added for dehydration (darker urine)  C/w diet & suppliments      Metabolic encephelopathy  - Due to severe anemia and metastatic brain cancer.   - On Comfort care.    Metastatic prostate cancer:  - Comfort care as above.     Productive cough & pleuritic right sided chest pain yesterday- CXR with atelectasis, Pox 98% on RA, afebrile, will monitor, does not c/o cough or CP today    Disposition- family to arrange family meeting with hospice / palliative team for disposition.

## 2018-06-05 NOTE — PROGRESS NOTE ADULT - SUBJECTIVE AND OBJECTIVE BOX
CC: patient being seen for end stage prostate cancer, dyspnea     Present Symptoms:     Dyspnea:  2   Nausea/Vomiting: No  Anxiety:  No  Depression: No  Fatigue: Yes   Loss of appetite: No    Pain: none             Character-            Duration-            Effect-            Factors-            Frequency-            Location-            Severity-    Review of Systems: Reviewed                                      Positive: dyspnea   All others negative    MEDICATIONS  (STANDING):  HYDROmorphone  Injectable 1 milliGRAM(s) IV Push every 4 hours  ondansetron Injectable 4 milliGRAM(s) IV Push every 6 hours  sodium chloride 0.9%. 1000 milliLiter(s) (75 mL/Hr) IV Continuous <Continuous>    MEDICATIONS  (PRN):  HYDROmorphone  Injectable 1 milliGRAM(s) IV Push every 2 hours PRN dyspnea or pain  LORazepam     Tablet 0.5 milliGRAM(s) Oral every 2 hours PRN Nausea and/or Vomiting, or agitation/anxiety    PHYSICAL EXAM:    Vital Signs Last 24 Hrs  T(C): 36.6 (04 Jun 2018 15:47), Max: 36.6 (04 Jun 2018 15:47)  T(F): 97.8 (04 Jun 2018 15:47), Max: 97.8 (04 Jun 2018 15:47)  HR: --  BP: 97/73 (04 Jun 2018 15:47) (97/73 - 97/73)  BP(mean): --  RR: 18 (04 Jun 2018 15:47) (18 - 18)  SpO2: 98% (04 Jun 2018 15:47) (98% - 98%)    General: alert; cachexia     Karnofsky:  20-30 %    HEENT: dry mouth     Lungs: tachypnea/labored breathing      CV: normal      GI: normal     : mckeon    MSK: weakness     Skin: no rash    LABS:    I&O's Summary    04 Jun 2018 07:01  -  05 Jun 2018 07:00  --------------------------------------------------------  IN: 0 mL / OUT: 400 mL / NET: -400 mL    RADIOLOGY & ADDITIONAL STUDIES:    ADVANCE DIRECTIVES: DNR/I

## 2018-06-05 NOTE — PROGRESS NOTE ADULT - ASSESSMENT
67M with end stage prostate cancer admitted with severe anemia, now with respiratory failure, cachexia, debility, worsening dyspnea.

## 2018-06-06 PROCEDURE — 99233 SBSQ HOSP IP/OBS HIGH 50: CPT

## 2018-06-06 RX ADMIN — HYDROMORPHONE HYDROCHLORIDE 1 MILLIGRAM(S): 2 INJECTION INTRAMUSCULAR; INTRAVENOUS; SUBCUTANEOUS at 01:57

## 2018-06-06 RX ADMIN — ONDANSETRON 4 MILLIGRAM(S): 8 TABLET, FILM COATED ORAL at 18:13

## 2018-06-06 RX ADMIN — SODIUM CHLORIDE 75 MILLILITER(S): 9 INJECTION INTRAMUSCULAR; INTRAVENOUS; SUBCUTANEOUS at 21:33

## 2018-06-06 RX ADMIN — HYDROMORPHONE HYDROCHLORIDE 1 MILLIGRAM(S): 2 INJECTION INTRAMUSCULAR; INTRAVENOUS; SUBCUTANEOUS at 05:08

## 2018-06-06 RX ADMIN — HYDROMORPHONE HYDROCHLORIDE 1 MILLIGRAM(S): 2 INJECTION INTRAMUSCULAR; INTRAVENOUS; SUBCUTANEOUS at 21:32

## 2018-06-06 RX ADMIN — HYDROMORPHONE HYDROCHLORIDE 1 MILLIGRAM(S): 2 INJECTION INTRAMUSCULAR; INTRAVENOUS; SUBCUTANEOUS at 10:37

## 2018-06-06 RX ADMIN — ONDANSETRON 4 MILLIGRAM(S): 8 TABLET, FILM COATED ORAL at 23:14

## 2018-06-06 RX ADMIN — HYDROMORPHONE HYDROCHLORIDE 1 MILLIGRAM(S): 2 INJECTION INTRAMUSCULAR; INTRAVENOUS; SUBCUTANEOUS at 19:00

## 2018-06-06 RX ADMIN — HYDROMORPHONE HYDROCHLORIDE 1 MILLIGRAM(S): 2 INJECTION INTRAMUSCULAR; INTRAVENOUS; SUBCUTANEOUS at 02:36

## 2018-06-06 RX ADMIN — HYDROMORPHONE HYDROCHLORIDE 1 MILLIGRAM(S): 2 INJECTION INTRAMUSCULAR; INTRAVENOUS; SUBCUTANEOUS at 14:20

## 2018-06-06 RX ADMIN — SODIUM CHLORIDE 75 MILLILITER(S): 9 INJECTION INTRAMUSCULAR; INTRAVENOUS; SUBCUTANEOUS at 11:35

## 2018-06-06 RX ADMIN — HYDROMORPHONE HYDROCHLORIDE 1 MILLIGRAM(S): 2 INJECTION INTRAMUSCULAR; INTRAVENOUS; SUBCUTANEOUS at 11:00

## 2018-06-06 RX ADMIN — ONDANSETRON 4 MILLIGRAM(S): 8 TABLET, FILM COATED ORAL at 11:32

## 2018-06-06 RX ADMIN — ONDANSETRON 4 MILLIGRAM(S): 8 TABLET, FILM COATED ORAL at 05:08

## 2018-06-06 RX ADMIN — HYDROMORPHONE HYDROCHLORIDE 1 MILLIGRAM(S): 2 INJECTION INTRAMUSCULAR; INTRAVENOUS; SUBCUTANEOUS at 18:14

## 2018-06-06 RX ADMIN — SODIUM CHLORIDE 75 MILLILITER(S): 9 INJECTION INTRAMUSCULAR; INTRAVENOUS; SUBCUTANEOUS at 05:58

## 2018-06-06 RX ADMIN — HYDROMORPHONE HYDROCHLORIDE 1 MILLIGRAM(S): 2 INJECTION INTRAMUSCULAR; INTRAVENOUS; SUBCUTANEOUS at 14:02

## 2018-06-06 RX ADMIN — HYDROMORPHONE HYDROCHLORIDE 1 MILLIGRAM(S): 2 INJECTION INTRAMUSCULAR; INTRAVENOUS; SUBCUTANEOUS at 05:42

## 2018-06-06 NOTE — PROGRESS NOTE ADULT - SUBJECTIVE AND OBJECTIVE BOX
CHIEF COMPLAINT/INTERVAL HISTORY:    Patient is a 67y old  Male who presents with a chief complaint of vomiting blood, weakness (28 May 2018 21:38)      HPI:  Pt is a 68 yo Male with a pmh/o afib previously on coumadin and advanced stage 4 metastatic Prostate CA who was referred to hospice but opted for home care, who is no longer on treatment who presents to ED with family for rapid decline in PO intake, mentation, and quality of life. Family at bedside to provide history. Family states he is recently, constantly in pain, moaning and less and less alert. Pt was evaluated by ICU and exstensive conversation had where DNR/DNI and comfort goals established. Pt was transfused with appropriate response in ED after HgB 3. Pt had questionable episode of hematemesis at home after strawberry shake, no nausea or vomiting noted in ED since presentation, no diarrhea, no cp, no palpitations. (28 May 2018 21:38)      SUBJECTIVE & OBJECTIVE/ ROS: Pt seen and examined at bedside.  No overnight issues reported. No family at bedside today. Pt does not voice any complaints.     ICU Vital Signs Last 24 Hrs  T(C): 36.9 (05 Jun 2018 23:24), Max: 37 (05 Jun 2018 16:26)  T(F): 98.5 (05 Jun 2018 23:24), Max: 98.6 (05 Jun 2018 16:26)  HR: 111 (05 Jun 2018 23:24) (108 - 111)  BP: 99/57 (05 Jun 2018 23:24) (94/66 - 99/57)  BP(mean): --  ABP: --  ABP(mean): --  RR: 18 (05 Jun 2018 23:24) (18 - 18)  SpO2: 98% (05 Jun 2018 23:24) (98% - 98%)        MEDICATIONS  (STANDING):  HYDROmorphone  Injectable 1 milliGRAM(s) IV Push every 4 hours  ondansetron Injectable 4 milliGRAM(s) IV Push every 6 hours  sodium chloride 0.9%. 1000 milliLiter(s) (75 mL/Hr) IV Continuous <Continuous>    MEDICATIONS  (PRN):  HYDROmorphone  Injectable 1 milliGRAM(s) IV Push every 2 hours PRN dyspnea or pain  LORazepam     Tablet 0.5 milliGRAM(s) Oral every 2 hours PRN Nausea and/or Vomiting, or agitation/anxiety      LABS:                CAPILLARY BLOOD GLUCOSE      POCT Blood Glucose.: 161 mg/dL (05 Jun 2018 23:09)      RECENT CULTURES:      RADIOLOGY & ADDITIONAL TESTS:      PHYSICAL EXAM:  GENERAL: Pt ill looking, cachexia, NAD.   CHEST/LUNG: Clear to auscultation bilaterally; No wheezing.  HEART: S1S2+, Regular rate and rhythm; No murmurs.  ABDOMEN: Soft, Nontender, Nondistended; Bowel sounds present.  Extremities: No LE edema.  NEURO: AAOX2, follows simple commands.   +Sagastume with dark urine

## 2018-06-06 NOTE — PROGRESS NOTE ADULT - ASSESSMENT
Pt is a 68 yo Male with a pmh/o afib previously on coumadin and advanced stage 4 metastatic Prostate CA who was referred to hospice but opted for home care in past, who is no longer on treatment, presents to ED with family for rapid decline in PO intake, mentation, and poor quality of life. Pt had questionable episode of hematemesis at home after strawberry shake. In ED noted to have hemoglobin <4, hypotensive with altered mental status. Pt was seen by GI and MICU in ED, MICU team had an extensive conversation with family (Wife /Son) as documented in MICU note- they requested DNR /DNI and comfort measures, denied any aggressive intervention hence deemed not MICU candidate and admitted to hospitalist service for acute symptomatic severe anemia likely GI bleeding with hemodynamically instability. GI signed off given Pt being on comfort care. Palliative /hospice on board and Pt is on comfort care.     Prior hospitalist spoke to Pt's wife Royal Caballero multiple times over the phone and discussed the GOC at length including daily labs, feeding tube, blood transfusion- She states Pt should remain on comfort care, no daily labs / blood transfusion and no feeding tube, DNR /I. She further stated she will try to bring family to meet with hospice team to decide on hospice inn but has not been here yet. Hospice /Palliative team called her multiple times initially unreachable and then said talk to my son to discuss hospice service. Hospice and prior hospitalist did call to his son Mr Escalera  ([phone]) but did not , voice mail left. I tried calling wife Royal [phone] yesterday but did not answer, left VM.     Acute symptomatic severe anemia likely GI bleeding:  - S/p 3 units pRBC transfusion on admission.    - GI /MICU note reviewed. MICU team also had extensive discussion with wife /son on admission and family opted for comfort care.  - No Aggressive intervention per family, comfort care, avoid daily labs / blood transtrusion   - Palliative / Hospice on board, pending family meeting for hospice inn consent.  - C/w comfort measures. Pain meds & ativan prn   I tried calling wife Royal [phone] yesterday but did not answer, left VM.   Awaiting family's decision about hospice. Pt has already been accepted in hospice inn but family unable to decide. Will try reaching out to son once again.   IVF added for dehydration (darker urine)  C/w diet & suppliments      Metabolic encephelopathy  - Due to severe anemia and metastatic brain cancer.   - On Comfort care.    Metastatic prostate cancer:  - Comfort care as above.     Productive cough & pleuritic right sided chest pain yesterday- CXR with atelectasis, Pox 98% on RA, afebrile, will monitor, does not c/o cough or CP today    Disposition- Await family to get back to hospitalist/ hospice / palliative team for disposition. Multiple attempts being made.

## 2018-06-06 NOTE — GOALS OF CARE CONVERSATION - PERSONAL ADVANCE DIRECTIVE - NS PRO AD PATIENT TYPE
Do Not Resuscitate (DNR)

## 2018-06-06 NOTE — GOALS OF CARE CONVERSATION - PERSONAL ADVANCE DIRECTIVE - CONVERSATION DETAILS
Call placed and voicemail left this morning to patient's son to inquire about decision to accept hospice services and potential transfer to inpatient level of care.  Please call [facility] at [phone] with any questions or concerns.

## 2018-06-06 NOTE — GOALS OF CARE CONVERSATION - PERSONAL ADVANCE DIRECTIVE - CONVERSATION/DISCUSSION
Hospice Referral

## 2018-06-07 PROCEDURE — 99233 SBSQ HOSP IP/OBS HIGH 50: CPT

## 2018-06-07 PROCEDURE — 99232 SBSQ HOSP IP/OBS MODERATE 35: CPT

## 2018-06-07 RX ADMIN — HYDROMORPHONE HYDROCHLORIDE 1 MILLIGRAM(S): 2 INJECTION INTRAMUSCULAR; INTRAVENOUS; SUBCUTANEOUS at 17:09

## 2018-06-07 RX ADMIN — HYDROMORPHONE HYDROCHLORIDE 1 MILLIGRAM(S): 2 INJECTION INTRAMUSCULAR; INTRAVENOUS; SUBCUTANEOUS at 12:45

## 2018-06-07 RX ADMIN — ONDANSETRON 4 MILLIGRAM(S): 8 TABLET, FILM COATED ORAL at 05:40

## 2018-06-07 RX ADMIN — ONDANSETRON 4 MILLIGRAM(S): 8 TABLET, FILM COATED ORAL at 17:08

## 2018-06-07 RX ADMIN — HYDROMORPHONE HYDROCHLORIDE 1 MILLIGRAM(S): 2 INJECTION INTRAMUSCULAR; INTRAVENOUS; SUBCUTANEOUS at 01:49

## 2018-06-07 RX ADMIN — HYDROMORPHONE HYDROCHLORIDE 1 MILLIGRAM(S): 2 INJECTION INTRAMUSCULAR; INTRAVENOUS; SUBCUTANEOUS at 17:08

## 2018-06-07 RX ADMIN — HYDROMORPHONE HYDROCHLORIDE 1 MILLIGRAM(S): 2 INJECTION INTRAMUSCULAR; INTRAVENOUS; SUBCUTANEOUS at 05:40

## 2018-06-07 RX ADMIN — ONDANSETRON 4 MILLIGRAM(S): 8 TABLET, FILM COATED ORAL at 12:46

## 2018-06-07 RX ADMIN — HYDROMORPHONE HYDROCHLORIDE 1 MILLIGRAM(S): 2 INJECTION INTRAMUSCULAR; INTRAVENOUS; SUBCUTANEOUS at 22:06

## 2018-06-07 RX ADMIN — HYDROMORPHONE HYDROCHLORIDE 1 MILLIGRAM(S): 2 INJECTION INTRAMUSCULAR; INTRAVENOUS; SUBCUTANEOUS at 13:23

## 2018-06-07 RX ADMIN — Medication 0.5 MILLIGRAM(S): at 17:08

## 2018-06-07 RX ADMIN — HYDROMORPHONE HYDROCHLORIDE 1 MILLIGRAM(S): 2 INJECTION INTRAMUSCULAR; INTRAVENOUS; SUBCUTANEOUS at 22:36

## 2018-06-07 NOTE — PROGRESS NOTE ADULT - PROBLEM SELECTOR PLAN 4
- full assist with all ADLs
- patient on comfort care, appears very short of breath, increased dilaudid, hospice spoke to son this AM. He has the consents and will be reaching back out regarding transitioning to hospice inpatient unit.
-patient appears much more comfortable today. continue comfort measures.

## 2018-06-07 NOTE — PROGRESS NOTE ADULT - PROBLEM SELECTOR PLAN 1
- end stage
-terminal
He has had no further hematemesis here and has been made Comfort Care by his daughter. Therefore there are no plans or need for EGD presently. Can re feed as tolerated, IV Pantoprazole for now. Signing off. Reconsult as needed. Thank you.
-terminal

## 2018-06-07 NOTE — PROGRESS NOTE ADULT - SUBJECTIVE AND OBJECTIVE BOX
CHIEF COMPLAINT/INTERVAL HISTORY:    Patient is a 67y old  Male who presents with a chief complaint of vomiting blood, weakness (28 May 2018 21:38)      HPI:  Pt is a 66 yo Male with a pmh/o afib previously on coumadin and advanced stage 4 metastatic Prostate CA who was referred to hospice but opted for home care, who is no longer on treatment who presents to ED with family for rapid decline in PO intake, mentation, and quality of life. Family at bedside to provide history. Family states he is recently, constantly in pain, moaning and less and less alert. Pt was evaluated by ICU and exstensive conversation had where DNR/DNI and comfort goals established. Pt was transfused with appropriate response in ED after HgB 3. Pt had questionable episode of hematemesis at home after strawberry shake, no nausea or vomiting noted in ED since presentation, no diarrhea, no cp, no palpitations. (28 May 2018 21:38)      SUBJECTIVE & OBJECTIVE/ ROS: Pt seen and examined at bedside. Voices no complaints at this time. Appears comfortable.     ICU Vital Signs Last 24 Hrs  T(C): 36.9 (07 Jun 2018 08:41), Max: 36.9 (07 Jun 2018 08:41)  T(F): 98.4 (07 Jun 2018 08:41), Max: 98.4 (07 Jun 2018 08:41)  HR: 100 (07 Jun 2018 08:41) (100 - 105)  BP: 96/52 (07 Jun 2018 08:41) (96/52 - 105/65)  BP(mean): --  ABP: --  ABP(mean): --  RR: 18 (07 Jun 2018 08:41) (18 - 18)  SpO2: 99% (07 Jun 2018 08:41) (99% - 100%)        MEDICATIONS  (STANDING):  guaiFENesin  milliGRAM(s) Oral every 12 hours  HYDROmorphone  Injectable 1 milliGRAM(s) IV Push every 4 hours  ondansetron Injectable 4 milliGRAM(s) IV Push every 6 hours  sodium chloride 0.9%. 1000 milliLiter(s) (75 mL/Hr) IV Continuous <Continuous>    MEDICATIONS  (PRN):  HYDROmorphone  Injectable 1 milliGRAM(s) IV Push every 2 hours PRN dyspnea or pain  LORazepam     Tablet 0.5 milliGRAM(s) Oral every 2 hours PRN Nausea and/or Vomiting, or agitation/anxiety      LABS:                CAPILLARY BLOOD GLUCOSE          RECENT CULTURES:      RADIOLOGY & ADDITIONAL TESTS:      PHYSICAL EXAM:    PHYSICAL EXAM:  GENERAL: Pt ill looking, cachexia, NAD.   CHEST/LUNG: Clear to auscultation bilaterally; No wheezing.  HEART: S1S2+, Regular rate and rhythm; No murmurs.  ABDOMEN: Soft, Nontender, Nondistended; Bowel sounds present.  Extremities: No LE edema.  NEURO: AAOX2, follows simple commands.   +Sagastume

## 2018-06-07 NOTE — PROGRESS NOTE ADULT - ASSESSMENT
Pt is a 66 yo Male with a pmh/o afib previously on coumadin and advanced stage 4 metastatic Prostate CA who was referred to hospice but opted for home care in past, who is no longer on treatment, presents to ED with family for rapid decline in PO intake, mentation, and poor quality of life. Pt had questionable episode of hematemesis at home after strawberry shake. In ED noted to have hemoglobin <4, hypotensive with altered mental status. Pt was seen by GI and MICU in ED, MICU team had an extensive conversation with family (Wife /Son) as documented in MICU note- they requested DNR /DNI and comfort measures, denied any aggressive intervention hence deemed not MICU candidate and admitted to hospitalist service for acute symptomatic severe anemia likely GI bleeding with hemodynamically instability. GI signed off given Pt being on comfort care. Palliative /hospice on board and Pt is on comfort care.     Prior hospitalist spoke to Pt's wife Royal Caballero multiple times over the phone and discussed the GOC at length including daily labs, feeding tube, blood transfusion- She states Pt should remain on comfort care, no daily labs / blood transfusion and no feeding tube, DNR /I. She further stated she will try to bring family to meet with hospice team to decide on hospice inn but has not been here yet. Hospice /Palliative team called her multiple times initially unreachable and then said talk to my son to discuss hospice service. Hospice, prior hospitalist & myself have made multiple attempts calling his son Mr Escalera  ([phone]) & wife Royal [phone] but no response yet. Multiple voice messages left.     >Acute symptomatic severe anemia likely GI bleeding:  - S/p 3 units pRBC transfusion on admission.    - GI /MICU note reviewed. MICU team also had extensive discussion with wife /son on admission and family opted for comfort care.  - C/w comfort measures. Pain meds & ativan prn  - No Aggressive intervention per family, comfort care, avoid daily labs / blood transtrusion   - Palliative / Hospice on board, Awaiting family's decision about hospice. Pt has already been accepted in hospice inn but family unable to decide. Discharge pending family decision for hospice inn, pt is AAO x 1 so cannot make his own decisions. Hospice, prior hospitalist & myself have made multiple attempts calling his son Mr Escalera  ([phone]) & wife Royal [phone] but no response yet. Multiple voice messages left. I called son Deepak again this morning & VM left. Complex case manager Jenna involved who tried calling the wife as well.    IVF added for dehydration (darker urine)  C/w diet & suppliments      >Metabolic encephelopathy   - Due to severe anemia and metastatic brain cancer.   - On Comfort care.    Metastatic prostate cancer:  - Comfort care as above.         Disposition- Await family to get back to hospitalist/ hospice / palliative team for disposition. Multiple attempts being made.  Pt made ALC.

## 2018-06-07 NOTE — PROGRESS NOTE ADULT - PROBLEM SELECTOR PLAN 5
-patient doing very poorly, and getting worse, hospice and all team members have made multiple attempts to reach family but unable. I spoke to family friend at bedside and spoke to her about importance of getting family to reach back out to us so we can get him to the appropriate place.

## 2018-06-07 NOTE — PROGRESS NOTE ADULT - PROBLEM SELECTOR PLAN 2
Pt with metastatic Prostate CA with bone and possible brain mets. Now Comfort Care. Overall prognosis is extremely poor.
-continue ATC dilaudid, he may need infusion in next 24 hours
-increased dilaudid to IV Q 4 hours as patient is still symptomatic.
-better with Dilaudid ATC

## 2018-06-07 NOTE — PROGRESS NOTE ADULT - ASSESSMENT
67M with end stage prostate cancer, 67M with end stage prostate cancer, with respiratory failure, cachexia, debility, intractable nausea and vomiting at end of life.

## 2018-06-07 NOTE — PROGRESS NOTE ADULT - PROBLEM SELECTOR PROBLEM 3
Nausea and vomiting, intractability of vomiting not specified, unspecified vomiting type
Debility
Nausea and vomiting, intractability of vomiting not specified, unspecified vomiting type

## 2018-06-07 NOTE — PROGRESS NOTE ADULT - PROBLEM SELECTOR PLAN 3
- continue zofran ATC, ordered ativan ATC as well to help as zofran
- full assist with all ADLs.
-improved with ATC zofran

## 2018-06-07 NOTE — PROGRESS NOTE ADULT - SUBJECTIVE AND OBJECTIVE BOX
CC: patient being seen for end of life care; respiratory failure.     Present Symptoms:     Dyspnea: 0 1 2 3   Nausea/Vomiting: Yes No  Anxiety:  Yes No  Depression: Yes No  Fatigue: Yes No  Loss of appetite: Yes No    Pain:             Character-            Duration-            Effect-            Factors-            Frequency-            Location-            Severity-    Review of Systems: Reviewed                     Negative:                     Positive:  Unable to obtain due to poor mentation   All others negative    MEDICATIONS  (STANDING):  guaiFENesin  milliGRAM(s) Oral every 12 hours  HYDROmorphone  Injectable 1 milliGRAM(s) IV Push every 4 hours  ondansetron Injectable 4 milliGRAM(s) IV Push every 6 hours  sodium chloride 0.9%. 1000 milliLiter(s) (75 mL/Hr) IV Continuous <Continuous>    MEDICATIONS  (PRN):  HYDROmorphone  Injectable 1 milliGRAM(s) IV Push every 2 hours PRN dyspnea or pain  LORazepam     Tablet 0.5 milliGRAM(s) Oral every 2 hours PRN Nausea and/or Vomiting, or agitation/anxiety    PHYSICAL EXAM:    Vital Signs Last 24 Hrs  T(C): 36.9 (07 Jun 2018 08:41), Max: 36.9 (07 Jun 2018 08:41)  T(F): 98.4 (07 Jun 2018 08:41), Max: 98.4 (07 Jun 2018 08:41)  HR: 100 (07 Jun 2018 08:41) (100 - 105)  BP: 96/52 (07 Jun 2018 08:41) (96/52 - 105/65)  BP(mean): --  RR: 18 (07 Jun 2018 08:41) (18 - 18)  SpO2: 99% (07 Jun 2018 08:41) (99% - 100%)    General: alert  oriented x ____ lethargic agitated                  cachexia  nonverbal  coma    Karnofsky:  %    HEENT: normal  dry mouth  ET tube/trach    Lungs: comfortable tachypnea/labored breathing  excessive secretions    CV: normal  tachycardia    GI: normal  distended  tender  no BS               PEG/NG/OG tube  constipation  last BM:     : normal  incontinent  oliguria/anuria  mckeon    MSK: normal  weakness  edema             ambulatory  bedbound/wheelchair bound    Skin: normal  pressure ulcers- Stage_____  no rash    LABS:    I&O's Summary    06 Jun 2018 07:01  -  07 Jun 2018 07:00  --------------------------------------------------------  IN: 600 mL / OUT: 1250 mL / NET: -650 mL    07 Jun 2018 07:01  -  07 Jun 2018 12:22  --------------------------------------------------------  IN: 325 mL / OUT: 0 mL / NET: 325 mL    RADIOLOGY & ADDITIONAL STUDIES:    ADVANCE DIRECTIVES: DNR/I CC: patient being seen for end of life care; respiratory failure.     Present Symptoms:     Dyspnea: 2  Nausea/Vomiting: Yes   Anxiety:  Yes   Depression: No  Fatigue: Yes   Loss of appetite: Yes     Pain: yes - back he is unable to characterize further             Character-            Duration-            Effect-            Factors-            Frequency-            Location-            Severity-    Review of Systems: Reviewed                                        Positive: back pain, nausea, vomiting   All others negative    MEDICATIONS  (STANDING):  guaiFENesin  milliGRAM(s) Oral every 12 hours  HYDROmorphone  Injectable 1 milliGRAM(s) IV Push every 4 hours  ondansetron Injectable 4 milliGRAM(s) IV Push every 6 hours  sodium chloride 0.9%. 1000 milliLiter(s) (75 mL/Hr) IV Continuous <Continuous>    MEDICATIONS  (PRN):  HYDROmorphone  Injectable 1 milliGRAM(s) IV Push every 2 hours PRN dyspnea or pain  LORazepam     Tablet 0.5 milliGRAM(s) Oral every 2 hours PRN Nausea and/or Vomiting, or agitation/anxiety    PHYSICAL EXAM:    Vital Signs Last 24 Hrs  T(C): 36.9 (07 Jun 2018 08:41), Max: 36.9 (07 Jun 2018 08:41)  T(F): 98.4 (07 Jun 2018 08:41), Max: 98.4 (07 Jun 2018 08:41)  HR: 100 (07 Jun 2018 08:41) (100 - 105)  BP: 96/52 (07 Jun 2018 08:41) (96/52 - 105/65)  BP(mean): --  RR: 18 (07 Jun 2018 08:41) (18 - 18)  SpO2: 99% (07 Jun 2018 08:41) (99% - 100%)    General: alert      Karnofsky:  20%    HEENT: dry mouth      Lungs: tachypnea/labored breathing     CV: normal      GI: normal      : normal     MSK: weakness      Skin: no rash    LABS:    I&O's Summary    06 Jun 2018 07:01  -  07 Jun 2018 07:00  --------------------------------------------------------  IN: 600 mL / OUT: 1250 mL / NET: -650 mL    07 Jun 2018 07:01  -  07 Jun 2018 12:22  --------------------------------------------------------  IN: 325 mL / OUT: 0 mL / NET: 325 mL    RADIOLOGY & ADDITIONAL STUDIES:    ADVANCE DIRECTIVES: DNR/I

## 2018-06-07 NOTE — PROGRESS NOTE ADULT - ATTENDING COMMENTS
Addendum 3PM- I spoke to Pt wife and updated about pt condition. I asked pt's wife that hospice team waiting to meet you to go over the hospice inn option and later hospice team also spoke wife- She stated that we should talk to Pt's son Mr Escalera  ([phone]) for hospice in option. I called his number and went to voice mail and msg left.
Thank you for the opportunity to assist with the care of this patient.   [facility] [phone].
Thank you for the opportunity to assist with the care of this patient.   [facility] [phone].
Thank you for the opportunity to assist with the care of this patient.   [facility] [phone].

## 2018-06-08 PROCEDURE — 99232 SBSQ HOSP IP/OBS MODERATE 35: CPT

## 2018-06-08 RX ORDER — ROBINUL 0.2 MG/ML
0.2 INJECTION INTRAMUSCULAR; INTRAVENOUS EVERY 6 HOURS
Qty: 0 | Refills: 0 | Status: DISCONTINUED | OUTPATIENT
Start: 2018-06-08 | End: 2018-06-10

## 2018-06-08 RX ORDER — HYDROMORPHONE HYDROCHLORIDE 2 MG/ML
1 INJECTION INTRAMUSCULAR; INTRAVENOUS; SUBCUTANEOUS
Qty: 0 | Refills: 0 | Status: DISCONTINUED | OUTPATIENT
Start: 2018-06-08 | End: 2018-06-10

## 2018-06-08 RX ORDER — ROBINUL 0.2 MG/ML
1 INJECTION INTRAMUSCULAR; INTRAVENOUS THREE TIMES A DAY
Qty: 0 | Refills: 0 | Status: DISCONTINUED | OUTPATIENT
Start: 2018-06-08 | End: 2018-06-08

## 2018-06-08 RX ADMIN — ONDANSETRON 4 MILLIGRAM(S): 8 TABLET, FILM COATED ORAL at 17:29

## 2018-06-08 RX ADMIN — HYDROMORPHONE HYDROCHLORIDE 1 MILLIGRAM(S): 2 INJECTION INTRAMUSCULAR; INTRAVENOUS; SUBCUTANEOUS at 21:48

## 2018-06-08 RX ADMIN — HYDROMORPHONE HYDROCHLORIDE 1 MILLIGRAM(S): 2 INJECTION INTRAMUSCULAR; INTRAVENOUS; SUBCUTANEOUS at 09:46

## 2018-06-08 RX ADMIN — HYDROMORPHONE HYDROCHLORIDE 1 MILLIGRAM(S): 2 INJECTION INTRAMUSCULAR; INTRAVENOUS; SUBCUTANEOUS at 09:26

## 2018-06-08 RX ADMIN — HYDROMORPHONE HYDROCHLORIDE 1 MILLIGRAM(S): 2 INJECTION INTRAMUSCULAR; INTRAVENOUS; SUBCUTANEOUS at 13:59

## 2018-06-08 RX ADMIN — ONDANSETRON 4 MILLIGRAM(S): 8 TABLET, FILM COATED ORAL at 00:45

## 2018-06-08 RX ADMIN — Medication 0.5 MILLIGRAM(S): at 23:36

## 2018-06-08 RX ADMIN — HYDROMORPHONE HYDROCHLORIDE 1 MILLIGRAM(S): 2 INJECTION INTRAMUSCULAR; INTRAVENOUS; SUBCUTANEOUS at 05:32

## 2018-06-08 RX ADMIN — ROBINUL 0.2 MILLIGRAM(S): 0.2 INJECTION INTRAMUSCULAR; INTRAVENOUS at 19:51

## 2018-06-08 RX ADMIN — HYDROMORPHONE HYDROCHLORIDE 1 MILLIGRAM(S): 2 INJECTION INTRAMUSCULAR; INTRAVENOUS; SUBCUTANEOUS at 17:25

## 2018-06-08 RX ADMIN — HYDROMORPHONE HYDROCHLORIDE 1 MILLIGRAM(S): 2 INJECTION INTRAMUSCULAR; INTRAVENOUS; SUBCUTANEOUS at 14:20

## 2018-06-08 RX ADMIN — HYDROMORPHONE HYDROCHLORIDE 1 MILLIGRAM(S): 2 INJECTION INTRAMUSCULAR; INTRAVENOUS; SUBCUTANEOUS at 05:02

## 2018-06-08 RX ADMIN — HYDROMORPHONE HYDROCHLORIDE 1 MILLIGRAM(S): 2 INJECTION INTRAMUSCULAR; INTRAVENOUS; SUBCUTANEOUS at 18:00

## 2018-06-08 RX ADMIN — ONDANSETRON 4 MILLIGRAM(S): 8 TABLET, FILM COATED ORAL at 05:02

## 2018-06-08 RX ADMIN — HYDROMORPHONE HYDROCHLORIDE 1 MILLIGRAM(S): 2 INJECTION INTRAMUSCULAR; INTRAVENOUS; SUBCUTANEOUS at 21:33

## 2018-06-08 RX ADMIN — Medication 0.5 MILLIGRAM(S): at 00:45

## 2018-06-08 RX ADMIN — ONDANSETRON 4 MILLIGRAM(S): 8 TABLET, FILM COATED ORAL at 23:36

## 2018-06-08 RX ADMIN — ONDANSETRON 4 MILLIGRAM(S): 8 TABLET, FILM COATED ORAL at 12:11

## 2018-06-08 RX ADMIN — Medication 0.5 MILLIGRAM(S): at 05:02

## 2018-06-08 RX ADMIN — HYDROMORPHONE HYDROCHLORIDE 1 MILLIGRAM(S): 2 INJECTION INTRAMUSCULAR; INTRAVENOUS; SUBCUTANEOUS at 03:09

## 2018-06-08 RX ADMIN — Medication 0.5 MILLIGRAM(S): at 17:24

## 2018-06-08 RX ADMIN — HYDROMORPHONE HYDROCHLORIDE 1 MILLIGRAM(S): 2 INJECTION INTRAMUSCULAR; INTRAVENOUS; SUBCUTANEOUS at 02:39

## 2018-06-08 RX ADMIN — Medication 0.5 MILLIGRAM(S): at 12:10

## 2018-06-08 NOTE — PROGRESS NOTE ADULT - ASSESSMENT
Pt is a 68 yo Male with a pmh/o afib previously on coumadin and advanced stage 4 metastatic Prostate CA who was referred to hospice but opted for home care in past, who is no longer on treatment, presents to ED with family for rapid decline in PO intake, mentation, and poor quality of life. Pt had questionable episode of hematemesis at home after strawberry shake. In ED noted to have hemoglobin <4, hypotensive with altered mental status. Pt was seen by GI and MICU in ED, MICU team had an extensive conversation with family (Wife /Son) as documented in MICU note- they requested DNR /DNI and comfort measures, denied any aggressive intervention hence deemed not MICU candidate and admitted to hospitalist service for acute symptomatic severe anemia likely GI bleeding with hemodynamically instability. GI signed off given Pt being on comfort care. Palliative /hospice on board and Pt is on comfort care.     Prior hospitalist spoke to Pt's wife Royal Caballero multiple times over the phone and discussed the GOC at length including daily labs, feeding tube, blood transfusion- She states Pt should remain on comfort care, no daily labs / blood transfusion and no feeding tube, DNR /I. She further stated she will try to bring family to meet with hospice team to decide on hospice inn but has not been here yet. Hospice /Palliative team called her multiple times initially unreachable and then said talk to my son to discuss hospice service. Hospice, prior hospitalist & myself have made multiple attempts calling his son Mr Escalera  ([phone]) & wife Royal [phone] but no response yet. Multiple voice messages left.     >Acute symptomatic severe anemia likely GI bleeding:  - S/p 3 units pRBC transfusion on admission.    - GI /MICU note reviewed. MICU team also had extensive discussion with wife /son on admission and family opted for comfort care.  - C/w comfort measures. Pain meds & ativan prn  - No Aggressive intervention per family, comfort care, avoid daily labs / blood transtrusion   - Palliative / Hospice on board, Awaiting family's decision about hospice. Pt has already been accepted in hospice inn but family unable to decide. Discharge pending family decision for hospice inn, pt is AAO x 1 so cannot make his own decisions. Hospice, prior hospitalist & myself have made multiple attempts calling his son Mr Escalera  ([phone]) & wife Royal [phone] but no response yet. Multiple voice messages left. Complex case manager Jenna involved who tried calling the wife as well. Called son to update him about pt's new status, no answer, left VM  IVF added for dehydration (darker urine)  C/w diet & supplements      >Metabolic encephelopathy   - Due to severe anemia and metastatic brain cancer.   - On Comfort care.    Metastatic prostate cancer:  - Comfort care as above.         Disposition- Await family to get back to hospitalist/ hospice / palliative team for disposition. Multiple attempts being made.  Pt made ALC.

## 2018-06-08 NOTE — PROGRESS NOTE ADULT - SUBJECTIVE AND OBJECTIVE BOX
CHIEF COMPLAINT/INTERVAL HISTORY:    Patient is a 67y old  Male who presents with a chief complaint of vomiting blood, weakness (28 May 2018 21:38)      HPI:  Pt is a 66 yo Male with a pmh/o afib previously on coumadin and advanced stage 4 metastatic Prostate CA who was referred to hospice but opted for home care, who is no longer on treatment who presents to ED with family for rapid decline in PO intake, mentation, and quality of life. Family at bedside to provide history. Family states he is recently, constantly in pain, moaning and less and less alert. Pt was evaluated by ICU and exstensive conversation had where DNR/DNI and comfort goals established. Pt was transfused with appropriate response in ED after HgB 3. Pt had questionable episode of hematemesis at home after strawberry shake, no nausea or vomiting noted in ED since presentation, no diarrhea, no cp, no palpitations. (28 May 2018 21:38)      SUBJECTIVE & OBJECTIVE/ ROS: Pt seen and examined at bedside. Appears lethargic today barely arousable. Ativan added for comfort by palliative team. NO family at bedside.     ICU Vital Signs Last 24 Hrs  T(C): 36.7 (08 Jun 2018 08:02), Max: 36.7 (08 Jun 2018 08:02)  T(F): 98 (08 Jun 2018 08:02), Max: 98 (08 Jun 2018 08:02)  HR: 100 (08 Jun 2018 08:02) (100 - 100)  BP: 98/59 (08 Jun 2018 08:02) (98/59 - 98/59)  BP(mean): --  ABP: --  ABP(mean): --  RR: 19 (08 Jun 2018 08:02) (19 - 19)  SpO2: 100% (08 Jun 2018 08:02) (100% - 100%)        MEDICATIONS  (STANDING):  guaiFENesin  milliGRAM(s) Oral every 12 hours  HYDROmorphone  Injectable 1 milliGRAM(s) IV Push every 4 hours  LORazepam   Injectable 0.5 milliGRAM(s) IV Push every 6 hours  ondansetron Injectable 4 milliGRAM(s) IV Push every 6 hours  sodium chloride 0.9%. 1000 milliLiter(s) (75 mL/Hr) IV Continuous <Continuous>    MEDICATIONS  (PRN):  bisacodyl Suppository 10 milliGRAM(s) Rectal daily PRN Constipation  glycopyrrolate 1 milliGRAM(s) Oral three times a day PRN for secreations      LABS:                CAPILLARY BLOOD GLUCOSE          RECENT CULTURES:      RADIOLOGY & ADDITIONAL TESTS:      PHYSICAL EXAM:    GENERAL: Pt ill looking, cachexia, Appears lethargic today barely arousable  CHEST/LUNG: Clear to auscultation bilaterally; No wheezing.  HEART: S1S2+, Regular rate and rhythm; No murmurs.  ABDOMEN: Soft, Nontender, Nondistended; Bowel sounds present.  Extremities: No LE edema.  NEURO: Appears lethargic today barely arousable   +Sagastume

## 2018-06-09 PROCEDURE — 99232 SBSQ HOSP IP/OBS MODERATE 35: CPT

## 2018-06-09 RX ADMIN — Medication 0.5 MILLIGRAM(S): at 11:29

## 2018-06-09 RX ADMIN — SODIUM CHLORIDE 75 MILLILITER(S): 9 INJECTION INTRAMUSCULAR; INTRAVENOUS; SUBCUTANEOUS at 11:31

## 2018-06-09 RX ADMIN — HYDROMORPHONE HYDROCHLORIDE 1 MILLIGRAM(S): 2 INJECTION INTRAMUSCULAR; INTRAVENOUS; SUBCUTANEOUS at 22:12

## 2018-06-09 RX ADMIN — ROBINUL 0.2 MILLIGRAM(S): 0.2 INJECTION INTRAMUSCULAR; INTRAVENOUS at 02:13

## 2018-06-09 RX ADMIN — HYDROMORPHONE HYDROCHLORIDE 1 MILLIGRAM(S): 2 INJECTION INTRAMUSCULAR; INTRAVENOUS; SUBCUTANEOUS at 15:40

## 2018-06-09 RX ADMIN — HYDROMORPHONE HYDROCHLORIDE 1 MILLIGRAM(S): 2 INJECTION INTRAMUSCULAR; INTRAVENOUS; SUBCUTANEOUS at 06:07

## 2018-06-09 RX ADMIN — Medication 0.5 MILLIGRAM(S): at 06:06

## 2018-06-09 RX ADMIN — Medication 0.5 MILLIGRAM(S): at 23:16

## 2018-06-09 RX ADMIN — ROBINUL 0.2 MILLIGRAM(S): 0.2 INJECTION INTRAMUSCULAR; INTRAVENOUS at 13:46

## 2018-06-09 RX ADMIN — Medication 0.5 MILLIGRAM(S): at 18:26

## 2018-06-09 RX ADMIN — HYDROMORPHONE HYDROCHLORIDE 1 MILLIGRAM(S): 2 INJECTION INTRAMUSCULAR; INTRAVENOUS; SUBCUTANEOUS at 18:26

## 2018-06-09 RX ADMIN — HYDROMORPHONE HYDROCHLORIDE 1 MILLIGRAM(S): 2 INJECTION INTRAMUSCULAR; INTRAVENOUS; SUBCUTANEOUS at 06:22

## 2018-06-09 RX ADMIN — HYDROMORPHONE HYDROCHLORIDE 1 MILLIGRAM(S): 2 INJECTION INTRAMUSCULAR; INTRAVENOUS; SUBCUTANEOUS at 21:58

## 2018-06-09 RX ADMIN — HYDROMORPHONE HYDROCHLORIDE 1 MILLIGRAM(S): 2 INJECTION INTRAMUSCULAR; INTRAVENOUS; SUBCUTANEOUS at 16:00

## 2018-06-09 RX ADMIN — HYDROMORPHONE HYDROCHLORIDE 1 MILLIGRAM(S): 2 INJECTION INTRAMUSCULAR; INTRAVENOUS; SUBCUTANEOUS at 13:46

## 2018-06-09 RX ADMIN — HYDROMORPHONE HYDROCHLORIDE 1 MILLIGRAM(S): 2 INJECTION INTRAMUSCULAR; INTRAVENOUS; SUBCUTANEOUS at 02:13

## 2018-06-09 RX ADMIN — HYDROMORPHONE HYDROCHLORIDE 1 MILLIGRAM(S): 2 INJECTION INTRAMUSCULAR; INTRAVENOUS; SUBCUTANEOUS at 14:05

## 2018-06-09 RX ADMIN — HYDROMORPHONE HYDROCHLORIDE 1 MILLIGRAM(S): 2 INJECTION INTRAMUSCULAR; INTRAVENOUS; SUBCUTANEOUS at 02:28

## 2018-06-09 RX ADMIN — ONDANSETRON 4 MILLIGRAM(S): 8 TABLET, FILM COATED ORAL at 23:16

## 2018-06-09 RX ADMIN — HYDROMORPHONE HYDROCHLORIDE 1 MILLIGRAM(S): 2 INJECTION INTRAMUSCULAR; INTRAVENOUS; SUBCUTANEOUS at 09:47

## 2018-06-09 RX ADMIN — ONDANSETRON 4 MILLIGRAM(S): 8 TABLET, FILM COATED ORAL at 06:06

## 2018-06-09 RX ADMIN — HYDROMORPHONE HYDROCHLORIDE 1 MILLIGRAM(S): 2 INJECTION INTRAMUSCULAR; INTRAVENOUS; SUBCUTANEOUS at 10:24

## 2018-06-09 RX ADMIN — ONDANSETRON 4 MILLIGRAM(S): 8 TABLET, FILM COATED ORAL at 11:29

## 2018-06-09 RX ADMIN — ONDANSETRON 4 MILLIGRAM(S): 8 TABLET, FILM COATED ORAL at 18:26

## 2018-06-09 NOTE — PROGRESS NOTE ADULT - SUBJECTIVE AND OBJECTIVE BOX
CHIEF COMPLAINT/INTERVAL HISTORY:    Patient is a 67y old  Male who presents with a chief complaint of vomiting blood, weakness (28 May 2018 21:38)      HPI:  Pt is a 66 yo Male with a pmh/o afib previously on coumadin and advanced stage 4 metastatic Prostate CA who was referred to hospice but opted for home care, who is no longer on treatment who presents to ED with family for rapid decline in PO intake, mentation, and quality of life. Family at bedside to provide history. Family states he is recently, constantly in pain, moaning and less and less alert. Pt was evaluated by ICU and exstensive conversation had where DNR/DNI and comfort goals established. Pt was transfused with appropriate response in ED after HgB 3. Pt had questionable episode of hematemesis at home after strawberry shake, no nausea or vomiting noted in ED since presentation, no diarrhea, no cp, no palpitations. (28 May 2018 21:38)      SUBJECTIVE & OBJECTIVE/ ROS: Pt seen and examined at bedside. Pt barely arousable. Comfort measures continue.  NO family at bedside. Family comes in at night but do not answer their phone when the day team tries to get in touch with them    ICU Vital Signs Last 24 Hrs  T(C): 37.5 (09 Jun 2018 08:19), Max: 37.5 (09 Jun 2018 08:19)  T(F): 99.5 (09 Jun 2018 08:19), Max: 99.5 (09 Jun 2018 08:19)  HR: 129 (09 Jun 2018 08:19) (129 - 129)  BP: 95/59 (09 Jun 2018 08:19) (95/59 - 95/59)  BP(mean): --  ABP: --  ABP(mean): --  RR: 19 (09 Jun 2018 08:19) (19 - 19)  SpO2: --        MEDICATIONS  (STANDING):  guaiFENesin  milliGRAM(s) Oral every 12 hours  HYDROmorphone  Injectable 1 milliGRAM(s) IV Push every 4 hours  LORazepam   Injectable 0.5 milliGRAM(s) IV Push every 6 hours  ondansetron Injectable 4 milliGRAM(s) IV Push every 6 hours  sodium chloride 0.9%. 1000 milliLiter(s) (75 mL/Hr) IV Continuous <Continuous>    MEDICATIONS  (PRN):  bisacodyl Suppository 10 milliGRAM(s) Rectal daily PRN Constipation  glycopyrrolate Injectable 0.2 milliGRAM(s) IV Push every 6 hours PRN secretions  HYDROmorphone  Injectable 1 milliGRAM(s) IV Push every 2 hours PRN pain or dyspnea  LORazepam   Injectable 1 milliGRAM(s) IV Push every 2 hours PRN anxiety or agitation      LABS:                CAPILLARY BLOOD GLUCOSE          RECENT CULTURES:      RADIOLOGY & ADDITIONAL TESTS:      PHYSICAL EXAM:  GENERAL: Pt ill looking, cachexia, Appears lethargic today barely arousable  CHEST/LUNG: Clear to auscultation bilaterally; No wheezing.  HEART: S1S2+, Regular rate and rhythm; No murmurs.  ABDOMEN: Soft, Nontender, Nondistended; Bowel sounds present.  Extremities: No LE edema.  NEURO: Appears lethargic today barely arousable   +Sagastume

## 2018-06-09 NOTE — PROGRESS NOTE ADULT - ASSESSMENT
Pt is a 66 yo Male with a pmh/o afib previously on coumadin and advanced stage 4 metastatic Prostate CA who was referred to hospice but opted for home care in past, who is no longer on treatment, presents to ED with family for rapid decline in PO intake, mentation, and poor quality of life. Pt had questionable episode of hematemesis at home after strawberry shake. In ED noted to have hemoglobin <4, hypotensive with altered mental status. Pt was seen by GI and MICU in ED, MICU team had an extensive conversation with family (Wife /Son) as documented in MICU note- they requested DNR /DNI and comfort measures, denied any aggressive intervention hence deemed not MICU candidate and admitted to hospitalist service for acute symptomatic severe anemia likely GI bleeding with hemodynamically instability. GI signed off given Pt being on comfort care. Palliative /hospice on board and Pt is on comfort care.     Prior hospitalist spoke to Pt's wife Royal Caballero multiple times over the phone and discussed the GOC at length including daily labs, feeding tube, blood transfusion- She states Pt should remain on comfort care, no daily labs / blood transfusion and no feeding tube, DNR /I. She further stated she will try to bring family to meet with hospice team to decide on hospice inn but has not been here yet. Hospice /Palliative team called her multiple times initially unreachable and then said talk to my son to discuss hospice service. Hospice, prior hospitalist & myself have made multiple attempts calling his son Mr Escalera  ([phone]) & wife Royal [phone] but no response yet. Multiple voice messages left.     >Acute symptomatic severe anemia likely GI bleeding:  - S/p 3 units pRBC transfusion on admission.    - GI /MICU note reviewed. MICU team also had extensive discussion with wife /son on admission and family opted for comfort care.  - C/w comfort measures. Pain meds & ativan prn  - No Aggressive intervention per family, comfort care, avoid daily labs / blood transtrusion   - Palliative / Hospice on board, Awaiting family's decision about hospice. Pt has already been accepted in hospice inn but family unable to decide. Discharge pending family decision for hospice inn, pt is AAO x 1 so cannot make his own decisions. Hospice, prior hospitalist & myself have made multiple attempts calling his son Mr Escalera  ([phone]) & wife Royal [phone] but no response yet. Multiple voice messages left. Complex case manager Jenna involved who tried calling the wife as well. Called son to update him about pt's new status, no answer, left VM  IVF added for dehydration (darker urine)  C/w diet & supplements      >Metabolic encephelopathy   - Due to severe anemia and metastatic brain cancer.   - On Comfort care.    Metastatic prostate cancer:  - Comfort care as above.         Disposition- Await family to get back to hospitalist/ hospice / palliative team for disposition. Multiple attempts being made.  Pt made ALC.   Complex SW note: voicemail left with pt's son son Deepak ([phone]). No contact back from family. Hospice remains available , but is unable to transfer pt to the [facility] without family signing consents.

## 2018-06-10 VITALS
DIASTOLIC BLOOD PRESSURE: 50 MMHG | TEMPERATURE: 100 F | HEART RATE: 110 BPM | OXYGEN SATURATION: 92 % | RESPIRATION RATE: 12 BRPM | SYSTOLIC BLOOD PRESSURE: 90 MMHG

## 2018-06-10 PROCEDURE — P9016: CPT

## 2018-06-10 PROCEDURE — 93005 ELECTROCARDIOGRAM TRACING: CPT

## 2018-06-10 PROCEDURE — 82947 ASSAY GLUCOSE BLOOD QUANT: CPT

## 2018-06-10 PROCEDURE — 96375 TX/PRO/DX INJ NEW DRUG ADDON: CPT | Mod: XU

## 2018-06-10 PROCEDURE — 74174 CTA ABD&PLVS W/CONTRAST: CPT

## 2018-06-10 PROCEDURE — 86901 BLOOD TYPING SEROLOGIC RH(D): CPT

## 2018-06-10 PROCEDURE — 71045 X-RAY EXAM CHEST 1 VIEW: CPT

## 2018-06-10 PROCEDURE — 84100 ASSAY OF PHOSPHORUS: CPT

## 2018-06-10 PROCEDURE — 86850 RBC ANTIBODY SCREEN: CPT

## 2018-06-10 PROCEDURE — 99285 EMERGENCY DEPT VISIT HI MDM: CPT | Mod: 25

## 2018-06-10 PROCEDURE — 99232 SBSQ HOSP IP/OBS MODERATE 35: CPT

## 2018-06-10 PROCEDURE — 84295 ASSAY OF SERUM SODIUM: CPT

## 2018-06-10 PROCEDURE — 70450 CT HEAD/BRAIN W/O DYE: CPT

## 2018-06-10 PROCEDURE — 87086 URINE CULTURE/COLONY COUNT: CPT

## 2018-06-10 PROCEDURE — 80053 COMPREHEN METABOLIC PANEL: CPT

## 2018-06-10 PROCEDURE — 82330 ASSAY OF CALCIUM: CPT

## 2018-06-10 PROCEDURE — 85730 THROMBOPLASTIN TIME PARTIAL: CPT

## 2018-06-10 PROCEDURE — 85014 HEMATOCRIT: CPT

## 2018-06-10 PROCEDURE — 82803 BLOOD GASES ANY COMBINATION: CPT

## 2018-06-10 PROCEDURE — 83735 ASSAY OF MAGNESIUM: CPT

## 2018-06-10 PROCEDURE — 82435 ASSAY OF BLOOD CHLORIDE: CPT

## 2018-06-10 PROCEDURE — 83605 ASSAY OF LACTIC ACID: CPT

## 2018-06-10 PROCEDURE — 82009 KETONE BODYS QUAL: CPT

## 2018-06-10 PROCEDURE — 85018 HEMOGLOBIN: CPT

## 2018-06-10 PROCEDURE — 85027 COMPLETE CBC AUTOMATED: CPT

## 2018-06-10 PROCEDURE — 36415 COLL VENOUS BLD VENIPUNCTURE: CPT

## 2018-06-10 PROCEDURE — 84132 ASSAY OF SERUM POTASSIUM: CPT

## 2018-06-10 PROCEDURE — 96376 TX/PRO/DX INJ SAME DRUG ADON: CPT | Mod: XU

## 2018-06-10 PROCEDURE — 81001 URINALYSIS AUTO W/SCOPE: CPT

## 2018-06-10 PROCEDURE — 36430 TRANSFUSION BLD/BLD COMPNT: CPT

## 2018-06-10 PROCEDURE — 86900 BLOOD TYPING SEROLOGIC ABO: CPT

## 2018-06-10 PROCEDURE — 86923 COMPATIBILITY TEST ELECTRIC: CPT

## 2018-06-10 PROCEDURE — 96374 THER/PROPH/DIAG INJ IV PUSH: CPT | Mod: XU

## 2018-06-10 PROCEDURE — 82962 GLUCOSE BLOOD TEST: CPT

## 2018-06-10 PROCEDURE — 96372 THER/PROPH/DIAG INJ SC/IM: CPT | Mod: XU

## 2018-06-10 PROCEDURE — 85610 PROTHROMBIN TIME: CPT

## 2018-06-10 PROCEDURE — 87040 BLOOD CULTURE FOR BACTERIA: CPT

## 2018-06-10 RX ADMIN — HYDROMORPHONE HYDROCHLORIDE 1 MILLIGRAM(S): 2 INJECTION INTRAMUSCULAR; INTRAVENOUS; SUBCUTANEOUS at 01:34

## 2018-06-10 RX ADMIN — ONDANSETRON 4 MILLIGRAM(S): 8 TABLET, FILM COATED ORAL at 05:55

## 2018-06-10 RX ADMIN — HYDROMORPHONE HYDROCHLORIDE 1 MILLIGRAM(S): 2 INJECTION INTRAMUSCULAR; INTRAVENOUS; SUBCUTANEOUS at 14:35

## 2018-06-10 RX ADMIN — HYDROMORPHONE HYDROCHLORIDE 1 MILLIGRAM(S): 2 INJECTION INTRAMUSCULAR; INTRAVENOUS; SUBCUTANEOUS at 06:47

## 2018-06-10 RX ADMIN — ONDANSETRON 4 MILLIGRAM(S): 8 TABLET, FILM COATED ORAL at 12:28

## 2018-06-10 RX ADMIN — HYDROMORPHONE HYDROCHLORIDE 1 MILLIGRAM(S): 2 INJECTION INTRAMUSCULAR; INTRAVENOUS; SUBCUTANEOUS at 05:55

## 2018-06-10 RX ADMIN — HYDROMORPHONE HYDROCHLORIDE 1 MILLIGRAM(S): 2 INJECTION INTRAMUSCULAR; INTRAVENOUS; SUBCUTANEOUS at 05:10

## 2018-06-10 RX ADMIN — HYDROMORPHONE HYDROCHLORIDE 1 MILLIGRAM(S): 2 INJECTION INTRAMUSCULAR; INTRAVENOUS; SUBCUTANEOUS at 06:32

## 2018-06-10 RX ADMIN — SODIUM CHLORIDE 75 MILLILITER(S): 9 INJECTION INTRAMUSCULAR; INTRAVENOUS; SUBCUTANEOUS at 12:28

## 2018-06-10 RX ADMIN — HYDROMORPHONE HYDROCHLORIDE 1 MILLIGRAM(S): 2 INJECTION INTRAMUSCULAR; INTRAVENOUS; SUBCUTANEOUS at 01:49

## 2018-06-10 RX ADMIN — HYDROMORPHONE HYDROCHLORIDE 1 MILLIGRAM(S): 2 INJECTION INTRAMUSCULAR; INTRAVENOUS; SUBCUTANEOUS at 12:24

## 2018-06-10 RX ADMIN — Medication 0.5 MILLIGRAM(S): at 05:56

## 2018-06-10 NOTE — DISCHARGE NOTE FOR THE EXPIRED PATIENT - SECONDARY DIAGNOSIS.
Respiratory failure Prostate CA Hematemesis, presence of nausea not specified Anemia Malnutrition, calorie

## 2018-06-10 NOTE — PROGRESS NOTE ADULT - ASSESSMENT
Pt is a 66 yo Male with a pmh/o afib previously on coumadin and advanced stage 4 metastatic Prostate CA who was referred to hospice but opted for home care in past, who is no longer on treatment, presents to ED with family for rapid decline in PO intake, mentation, and poor quality of life. Pt had questionable episode of hematemesis at home after strawberry shake. In ED noted to have hemoglobin <4, hypotensive with altered mental status. Pt was seen by GI and MICU in ED, MICU team had an extensive conversation with family (Wife /Son) as documented in MICU note- they requested DNR /DNI and comfort measures, denied any aggressive intervention hence deemed not MICU candidate and admitted to hospitalist service for acute symptomatic severe anemia likely GI bleeding with hemodynamically instability. GI signed off given Pt being on comfort care. Palliative /hospice on board and Pt is on comfort care.     Prior hospitalist spoke to Pt's wife Royal Caballero multiple times over the phone and discussed the GOC at length including daily labs, feeding tube, blood transfusion- She states Pt should remain on comfort care, no daily labs / blood transfusion and no feeding tube, DNR /I. She further stated she will try to bring family to meet with hospice team to decide on hospice inn but has not been here yet. Hospice /Palliative team called her multiple times initially unreachable and then said talk to my son to discuss hospice service. Hospice, prior hospitalist & myself have made multiple attempts calling his son Mr Escalera  ([phone]) & wife Royal [phone] but no response yet. Multiple voice messages left.     1- Acute symptomatic severe anemia likely GI bleeding on admission   - S/p 3 units pRBC transfusion on admission.    - GI /MICU note reviewed. MICU team also had extensive discussion with wife /son on admission and family opted for comfort care.  - C/w comfort measures. Pain meds & ativan prn  - No Aggressive intervention per family, comfort care, avoid daily labs / blood transtrusion   - Palliative / Hospice on board, Awaiting family's decision about hospice. Pt has already been accepted in hospice inn but family unable to decide. Discharge pending family decision for hospice inn, pt is not responsive at present so cannot make his own decisions.   Hospice, prior hospitalist  have made multiple attempts calling his son Mr Escalera  ([phone]) & wife Royal [phone] but no response yet. Multiple voice messages left. Complex case manager Jenna involved who tried calling the wife as well. Called son to update him about pt's new status, no answer, left VM  IVF added for dehydration (darker urine)    2- Metabolic encephelopathy multiple factors worsening lethargy   - Due to severe anemia and metastatic brain cancer.   - On Comfort care    Metastatic prostate cancer:  - Comfort care as above.         Disposition- Await family to get back to hospitalist/ hospice / palliative team for disposition. Multiple attempts being made.  Pt made ALC.   Complex SW note: voicemail left with pt's son son Deepak ([phone]). No contact back from family. Hospice remains available , but is unable to transfer pt to the [facility] without family signing consents.

## 2018-06-10 NOTE — DISCHARGE NOTE FOR THE EXPIRED PATIENT - HOSPITAL COURSE
Pt is a 66 yo Male with a pmh/o afib previously on coumadin and advanced stage 4 metastatic Prostate CA who was referred to hospice but opted for home care in past, who is no longer on treatment, presents to ED with family for rapid decline in PO intake, mentation, and poor quality of life. Pt had questionable episode of hematemesis at home after strawberry shake. In ED noted to have hemoglobin <4, hypotensive with altered mental status. Pt was seen by GI and MICU in ED, MICU team had an extensive conversation with family (Wife /Son) as documented in MICU note- they requested DNR /DNI and comfort measures, denied any aggressive intervention hence deemed not MICU candidate and admitted to hospitalist service for acute symptomatic severe anemia likely GI bleeding with hemodynamically instability. GI signed off given Pt being on comfort care. Palliative /hospice was on board and Pt was placed on comfort care.     During hospital course   - S/p 3 units pRBC transfusion on admission.    - GI /MICU saw and evaluated, and their recommendations appreciated. MICU team also had extensive discussion with wife /son on admission and family opted for comfort care.  - C/w comfort measures. Pain meds & ativan prn  - No Aggressive intervention per family, comfort care, avoid daily labs / blood transtrusion   - Palliative / Hospice were also on board, awaiting family's decision about hospice. Pt was already been accepted in hospice inn but family unable to decide. Discharge pending family decision for hospice inn, pt was not responsive at the time so could not make his own decisions.   Hospice, prior hospitalist made multiple attempts calling his son Mr Escalera  ([phone]) & wife Royal [phone] but no response yet. Multiple voice messages left. Complex case manager Jenna involved who tried calling the wife as well. Called son to update him about pt's new status, no answer, left VMPt   IVF added for dehydration (darker urine)  Metabolic encephelopathy with multiple factors, with worsened lethargy due to severe anemia and metastatic brain cancer. Pt was placed on Comfort care.  The patient was later noted by RN to be unresponsive, and noted to have  today 6/10/2018 at 1608. The PA was called to assess pt.   On physical exam, pt noted with absent pulse, no descernable heart beat on auscultation, absent spontaneous breath sounds, absent pupillary response to light, no pupillary reflex. There was no response to sternal rub or painful stimuli. No signs of life was noted,  and pt pronounced  Pt is a 68 yo Male with a pmh/o afib previously on coumadin and advanced stage 4 metastatic Prostate CA who was referred to hospice but opted for home care in past, who is no longer on treatment, presents to ED with family for rapid decline in PO intake, mentation, and poor quality of life. Pt had questionable episode of hematemesis at home after strawberry shake. In ED noted to have hemoglobin <4, hypotensive with altered mental status. Pt was seen by GI and MICU in ED, MICU team had an extensive conversation with family (Wife /Son) as documented in MICU note- they requested DNR /DNI and comfort measures, denied any aggressive intervention hence deemed not MICU candidate and admitted to hospitalist service for acute symptomatic severe anemia likely GI bleeding with hemodynamically instability. GI signed off given Pt being on comfort care. Palliative /hospice was on board and Pt was placed on comfort care.     During hospital course   - S/p 3 units pRBC transfusion on admission.    - GI /MICU saw and evaluated, and their recommendations appreciated. MICU team also had extensive discussion with wife /son on admission and family opted for comfort care.  - C/w comfort measures. Pain meds & ativan prn  - No Aggressive intervention per family, comfort care, avoid daily labs / blood transtrusion   - Palliative / Hospice were also on board, awaiting family's decision about hospice. Pt was already been accepted in hospice inn but family unable to decide and sign the papers .Discharge pending family decision for hospice inn, pt was not responsive at the time so could not make his own decisions.   Hospice, prior hospitalist made multiple attempts calling his son Mr Escalera  ([phone]) & wife Royal [phone] but no response yet. Multiple voice messages left. Clinical  Jenna involved who tried calling the wife as well. Called son to update him about pt's new status, no answer, left VMP  IVF added for dehydration (darker urine)  Metabolic encephelopathy with multiple factors, with worsened lethargy due to severe anemia and metastatic prostate  cancer, failure to thrive  overall prognosis poor , DNR /DNI and per family wife and son documented by ICU provider , palliative care and prior hospitalist on the case ; pt was placed on Comfort care.  The patient was later noted by RN to be unresponsive, and noted to have  today 6/10/2018 at 1608. The PA was called to assess pt.   On physical exam, pt noted with absent pulse, no descernable heart beat on auscultation, absent spontaneous breath sounds, absent pupillary response to light, no pupillary reflex. There was no response to sternal rub or painful stimuli. No signs of life was noted,  and pt pronounced  .Family contacted and notified by PA and me .

## 2018-06-10 NOTE — CHART NOTE - NSCHARTNOTEFT_GEN_A_CORE
Source: Patient [ ]  Family [ ]   other [x ] unresponsive  Current Diet: puree    Patient reports [ ] nausea  [ ] vomiting [ ] diarrhea [ ] constipation  [ ]chewing problems [ ] swallowing issues  [ ] other:     PO intake:  < 50% [ ]   50-75%  [ ]   %  [x ]  other : 0%    Source for PO intake [ ] Patient [ ] family [ ] chart [ ] staff [ ] other    Enteral /Parenteral Nutrition:     Current Weight: N/A    % Weight Change     Pertinent Medications: MEDICATIONS  (STANDING):  guaiFENesin  milliGRAM(s) Oral every 12 hours  HYDROmorphone  Injectable 1 milliGRAM(s) IV Push every 4 hours  LORazepam   Injectable 0.5 milliGRAM(s) IV Push every 6 hours  ondansetron Injectable 4 milliGRAM(s) IV Push every 6 hours  sodium chloride 0.9%. 1000 milliLiter(s) (75 mL/Hr) IV Continuous <Continuous>    MEDICATIONS  (PRN):  bisacodyl Suppository 10 milliGRAM(s) Rectal daily PRN Constipation  glycopyrrolate Injectable 0.2 milliGRAM(s) IV Push every 6 hours PRN secretions  HYDROmorphone  Injectable 1 milliGRAM(s) IV Push every 2 hours PRN pain or dyspnea  LORazepam   Injectable 1 milliGRAM(s) IV Push every 2 hours PRN anxiety or agitation    Pertinent Labs:         Skin:     Nutrition focused physical exam conducted - found signs of malnutrition [ ]absent [ ]present    Subcutaneous fat loss: [ ] Orbital fat pads region, [ ]Buccal fat region, [ ]Triceps region,  [ ]Ribs region    Muscle wasting: [ ]Temples region, [ ]Clavicle region, [ ]Shoulder region, [ ]Scapula region, [ ]Interosseous region,  [ ]thigh region, [ ]Calf region    Estimated Needs:   [ x] no change since previous assessment  [ ] recalculated:     Current Nutrition Diagnosis: no diagnosis, pt is not eating and receiving only comfort measures    Recommendations:     Monitoring and Evaluation:   [ ] PO intake [ ] Tolerance to diet prescription [] Weights  [x] Follow up per protocol [] Labs:

## 2018-06-10 NOTE — PROGRESS NOTE ADULT - SUBJECTIVE AND OBJECTIVE BOX
Patient is a 67y old  Male who presents with a chief complaint of vomiting blood, weakness on may 28   he is unresponsive lethargic , chart reviewed   on comfort measures apparently pt's son and wife are not reachable and not coming to sign papers for hospice  and discharge planning     Vital Signs Last 24 Hrs  T(C): 37.5 (10 Kalin 2018 11:08), Max: 37.5 (10 Kalin 2018 11:08)  T(F): 99.5 (10 Kalin 2018 11:08), Max: 99.5 (10 Kalin 2018 11:08)  HR: 110 (10 Kalin 2018 11:08) (110 - 110)  BP: 90/50 (10 Kalin 2018 11:08) (90/50 - 90/50)  BP(mean): --  RR: 12 (10 Kalin 2018 11:08) (12 - 12)  SpO2: 92% (10 Kalin 2018 11:08) (92% - 92%)    PHYSICAL EXAM:  GENERAL: Pt ill looking, cachexia, Appears lethargic not arousable  tachypneic    CHEST/LUNG: Clear to auscultation bilaterally; No wheezing  HEART: S1S2+, Regular rate and rhythm; No murmurs.  ABDOMEN: Soft, Nontender, Nondistended; Bowel sounds present.  Extremities: upper extremity edema hand swelling   NEURO: Appears lethargic today barely arousable , not able to follow commands   +Sagastume     no recent labs

## 2018-06-14 ENCOUNTER — RESULT REVIEW (OUTPATIENT)
Age: 68
End: 2018-06-14

## 2018-06-14 PROCEDURE — 88341 IMHCHEM/IMCYTCHM EA ADD ANTB: CPT | Mod: 26

## 2018-06-14 PROCEDURE — 88342 IMHCHEM/IMCYTCHM 1ST ANTB: CPT | Mod: 26

## 2018-12-26 NOTE — ED PROVIDER NOTE - CPE EDP SKIN NORM
- possibly hypervolemic hyponatremia in setting of volume overload also compounded by pain   - will give 40 IV lasix at this time and monitor output, and 40 PO daily as part of home regimen   - if Na does not improve, will obtain urine studies normal... Possibly hypervolemic hyponatremia in setting of volume overload also compounded by pain. Gave 40 IV lasix overnight. Improved to Na 129 this am.  - c/w home lasix 40 mg qD  - if Na does not improve, will send urine studies

## 2019-07-02 NOTE — PROGRESS NOTE ADULT - PROVIDER SPECIALTY LIST ADULT
Hospitalist
Palliative Care
Gastroenterology
negative

## 2023-10-11 NOTE — ED ADULT NURSE REASSESSMENT NOTE - CONDITION
unchanged Detail Level: Zone Initiate Treatment: Tri-Francesca 0.01 %-4 %-0.05 % topical cream - Apply a pea sized amount to the affected areas on forearms at bedtime after cleansing x 12 weeks.Avoid sunlight. Render In Strict Bullet Format?: No

## 2023-11-22 NOTE — ED PROVIDER NOTE - NS ED NOTE AC HIGH RISK COUNTRIES
FOLLOW-UP  It is recommended you schedule a follow-up appointment with Dr. Lira or Kortney KIM around Return in about 1 year (around 11/22/2024)..    YOU MAY BE HAVING LABS OR IMAGING APPROXIMATELY 1-2 WEEKS BEFORE YOUR NEXT APPOINTMENT. WE WILL FOLLOW UP WITH YOU REGARDING YOUR LAB RESULTS AT YOUR NEXT APPOINTMENT UNLESS IT IS MEDICALLY NECESSARY TO DO SO SOONER. LABS MAY ALSO BE AVAILABLE FOR YOU TO REVIEW ON [facility] PRIOR TO YOUR APPOINTMENT.    Thank you for allowing us to care for you today. If you need to call for assistance or questions when our clinic is closed, your call will be answered the same day by one of our nursing staff at our call center.  After talking with staff, if you need further assistance, an on call clinician will be contacted to assist you.     Office hours are 8:00 am to 5:00 pm Monday through Friday.  If it is urgent that you speak with someone outside of these hours, the [facility] will be able to assist you.  You can reach the office by calling [phone].    Thank you for choosing Petty Lira MD and Kortney KIM as your Endocrinology providers.    At [facility], one important tool we use to improve our patient services is our Patient Survey.  Following your visit you may receive our survey in the mail.     Please take the time to complete the survey.     If your visit with us was great, we want to hear about it.     If we can improve, please let us know how.    
No
